# Patient Record
Sex: FEMALE | Race: BLACK OR AFRICAN AMERICAN | NOT HISPANIC OR LATINO | Employment: UNEMPLOYED | URBAN - METROPOLITAN AREA
[De-identification: names, ages, dates, MRNs, and addresses within clinical notes are randomized per-mention and may not be internally consistent; named-entity substitution may affect disease eponyms.]

---

## 2018-02-04 LAB — GROUP B STREP PCR: NEGATIVE

## 2018-08-29 LAB
HBV SURFACE AG SERPL QL IA: NEGATIVE
HIV 1+2 AB+HIV1 P24 AG SERPL QL IA: NEGATIVE

## 2018-12-03 LAB — GLU GEST SCREEN 1HR 50G: 99

## 2019-02-23 ENCOUNTER — HOSPITAL ENCOUNTER (INPATIENT)
Facility: CLINIC | Age: 29
LOS: 2 days | Discharge: HOME HEALTH | End: 2019-02-26
Attending: OBSTETRICS & GYNECOLOGY | Admitting: OBSTETRICS & GYNECOLOGY
Payer: COMMERCIAL

## 2019-02-23 PROBLEM — Z36.89 ENCOUNTER FOR TRIAGE IN PREGNANT PATIENT: Status: ACTIVE | Noted: 2019-02-23

## 2019-02-23 PROCEDURE — 40000268 ZZH STATISTIC NO CHARGES

## 2019-02-23 PROCEDURE — 84112 EVAL AMNIOTIC FLUID PROTEIN: CPT | Performed by: OBSTETRICS & GYNECOLOGY

## 2019-02-23 NOTE — LETTER
February 24, 2019      RE: Cici Bennett (patient)  [id number]  [facility] [id number]       To whom it may concern:    Cici Bennett was in the hospital to deliver her baby on 2/24/2019. Please excuse her  Zaid Falk from work to help care for her and their new baby Rex Mckeon.    Sincerely,           Betty Zapata MD

## 2019-02-24 LAB
ABO + RH BLD: NORMAL
ABO + RH BLD: NORMAL
AMPHETAMINES UR QL SCN: NEGATIVE
BASOPHILS # BLD AUTO: 0 10E9/L (ref 0–0.2)
BASOPHILS NFR BLD AUTO: 0.2 %
BLD GP AB SCN SERPL QL: NORMAL
BLOOD BANK CMNT PATIENT-IMP: NORMAL
CANNABINOIDS UR QL: NEGATIVE
COCAINE UR QL: NEGATIVE
DIFFERENTIAL METHOD BLD: ABNORMAL
EOSINOPHIL # BLD AUTO: 0.1 10E9/L (ref 0–0.7)
EOSINOPHIL NFR BLD AUTO: 0.9 %
ERYTHROCYTE [DISTWIDTH] IN BLOOD BY AUTOMATED COUNT: 12.7 % (ref 10–15)
GLUCOSE BLDC GLUCOMTR-MCNC: 101 MG/DL (ref 70–99)
GLUCOSE BLDC GLUCOMTR-MCNC: 64 MG/DL (ref 70–99)
GLUCOSE BLDC GLUCOMTR-MCNC: 68 MG/DL (ref 70–99)
HCT VFR BLD AUTO: 36.3 % (ref 35–47)
HGB BLD-MCNC: 12.4 G/DL (ref 11.7–15.7)
IMM GRANULOCYTES # BLD: 0.1 10E9/L (ref 0–0.4)
IMM GRANULOCYTES NFR BLD: 0.8 %
LYMPHOCYTES # BLD AUTO: 2 10E9/L (ref 0.8–5.3)
LYMPHOCYTES NFR BLD AUTO: 19.5 %
MCH RBC QN AUTO: 31.4 PG (ref 26.5–33)
MCHC RBC AUTO-ENTMCNC: 34.2 G/DL (ref 31.5–36.5)
MCV RBC AUTO: 92 FL (ref 78–100)
MONOCYTES # BLD AUTO: 0.6 10E9/L (ref 0–1.3)
MONOCYTES NFR BLD AUTO: 6.4 %
NEUTROPHILS # BLD AUTO: 7.3 10E9/L (ref 1.6–8.3)
NEUTROPHILS NFR BLD AUTO: 72.2 %
NRBC # BLD AUTO: 0 10*3/UL
NRBC BLD AUTO-RTO: 0 /100
OPIATES UR QL SCN: NEGATIVE
PCP UR QL SCN: NEGATIVE
PLATELET # BLD AUTO: 85 10E9/L (ref 150–450)
RBC # BLD AUTO: 3.95 10E12/L (ref 3.8–5.2)
RUPTURE OF FETAL MEMBRANES BY ROM PLUS: NEGATIVE
SPECIMEN EXP DATE BLD: NORMAL
T PALLIDUM AB SER QL: NONREACTIVE
WBC # BLD AUTO: 10.1 10E9/L (ref 4–11)

## 2019-02-24 PROCEDURE — 25800030 ZZH RX IP 258 OP 636: Performed by: INTERNAL MEDICINE

## 2019-02-24 PROCEDURE — 00000146 ZZHCL STATISTIC GLUCOSE BY METER IP

## 2019-02-24 PROCEDURE — 80307 DRUG TEST PRSMV CHEM ANLYZR: CPT | Performed by: STUDENT IN AN ORGANIZED HEALTH CARE EDUCATION/TRAINING PROGRAM

## 2019-02-24 PROCEDURE — 0KQM0ZZ REPAIR PERINEUM MUSCLE, OPEN APPROACH: ICD-10-PCS | Performed by: OBSTETRICS & GYNECOLOGY

## 2019-02-24 PROCEDURE — 25000132 ZZH RX MED GY IP 250 OP 250 PS 637

## 2019-02-24 PROCEDURE — 86900 BLOOD TYPING SEROLOGIC ABO: CPT | Performed by: STUDENT IN AN ORGANIZED HEALTH CARE EDUCATION/TRAINING PROGRAM

## 2019-02-24 PROCEDURE — 25000125 ZZHC RX 250

## 2019-02-24 PROCEDURE — 85025 COMPLETE CBC W/AUTO DIFF WBC: CPT | Performed by: STUDENT IN AN ORGANIZED HEALTH CARE EDUCATION/TRAINING PROGRAM

## 2019-02-24 PROCEDURE — 12000001 ZZH R&B MED SURG/OB UMMC

## 2019-02-24 PROCEDURE — 25000132 ZZH RX MED GY IP 250 OP 250 PS 637: Performed by: STUDENT IN AN ORGANIZED HEALTH CARE EDUCATION/TRAINING PROGRAM

## 2019-02-24 PROCEDURE — G0463 HOSPITAL OUTPT CLINIC VISIT: HCPCS

## 2019-02-24 PROCEDURE — 86850 RBC ANTIBODY SCREEN: CPT | Performed by: STUDENT IN AN ORGANIZED HEALTH CARE EDUCATION/TRAINING PROGRAM

## 2019-02-24 PROCEDURE — 25000125 ZZHC RX 250: Performed by: STUDENT IN AN ORGANIZED HEALTH CARE EDUCATION/TRAINING PROGRAM

## 2019-02-24 PROCEDURE — 87517 HEPATITIS B DNA QUANT: CPT | Performed by: STUDENT IN AN ORGANIZED HEALTH CARE EDUCATION/TRAINING PROGRAM

## 2019-02-24 PROCEDURE — 72200001 ZZH LABOR CARE VAGINAL DELIVERY SINGLE

## 2019-02-24 PROCEDURE — 25000128 H RX IP 250 OP 636: Performed by: STUDENT IN AN ORGANIZED HEALTH CARE EDUCATION/TRAINING PROGRAM

## 2019-02-24 PROCEDURE — 25000125 ZZHC RX 250: Performed by: INTERNAL MEDICINE

## 2019-02-24 PROCEDURE — 86901 BLOOD TYPING SEROLOGIC RH(D): CPT | Performed by: STUDENT IN AN ORGANIZED HEALTH CARE EDUCATION/TRAINING PROGRAM

## 2019-02-24 PROCEDURE — 0064U ANTB TP TOTAL&RPR IA QUAL: CPT | Performed by: STUDENT IN AN ORGANIZED HEALTH CARE EDUCATION/TRAINING PROGRAM

## 2019-02-24 RX ORDER — TERBUTALINE SULFATE 1 MG/ML
0.25 INJECTION, SOLUTION SUBCUTANEOUS
Status: DISCONTINUED | OUTPATIENT
Start: 2019-02-24 | End: 2019-02-24

## 2019-02-24 RX ORDER — OXYTOCIN/0.9 % SODIUM CHLORIDE 30/500 ML
100-340 PLASTIC BAG, INJECTION (ML) INTRAVENOUS CONTINUOUS PRN
Status: COMPLETED | OUTPATIENT
Start: 2019-02-24 | End: 2019-02-24

## 2019-02-24 RX ORDER — MISOPROSTOL 200 UG/1
TABLET ORAL
Status: COMPLETED
Start: 2019-02-24 | End: 2019-02-24

## 2019-02-24 RX ORDER — AMOXICILLIN 250 MG
1 CAPSULE ORAL 2 TIMES DAILY
Status: DISCONTINUED | OUTPATIENT
Start: 2019-02-24 | End: 2019-02-26 | Stop reason: HOSPADM

## 2019-02-24 RX ORDER — MISOPROSTOL 200 UG/1
800 TABLET ORAL
Status: DISCONTINUED | OUTPATIENT
Start: 2019-02-24 | End: 2019-02-26 | Stop reason: HOSPADM

## 2019-02-24 RX ORDER — BISACODYL 10 MG
10 SUPPOSITORY, RECTAL RECTAL DAILY PRN
Status: DISCONTINUED | OUTPATIENT
Start: 2019-02-26 | End: 2019-02-26 | Stop reason: HOSPADM

## 2019-02-24 RX ORDER — LIDOCAINE 40 MG/G
CREAM TOPICAL
Status: DISCONTINUED | OUTPATIENT
Start: 2019-02-24 | End: 2019-02-24

## 2019-02-24 RX ORDER — CARBOPROST TROMETHAMINE 250 UG/ML
250 INJECTION, SOLUTION INTRAMUSCULAR
Status: DISCONTINUED | OUTPATIENT
Start: 2019-02-24 | End: 2019-02-26 | Stop reason: HOSPADM

## 2019-02-24 RX ORDER — IBUPROFEN 600 MG/1
600 TABLET, FILM COATED ORAL EVERY 6 HOURS PRN
Status: DISCONTINUED | OUTPATIENT
Start: 2019-02-24 | End: 2019-02-26 | Stop reason: HOSPADM

## 2019-02-24 RX ORDER — NALOXONE HYDROCHLORIDE 0.4 MG/ML
.1-.4 INJECTION, SOLUTION INTRAMUSCULAR; INTRAVENOUS; SUBCUTANEOUS
Status: DISCONTINUED | OUTPATIENT
Start: 2019-02-24 | End: 2019-02-24

## 2019-02-24 RX ORDER — HYDROCORTISONE 2.5 %
CREAM (GRAM) TOPICAL 3 TIMES DAILY PRN
Status: DISCONTINUED | OUTPATIENT
Start: 2019-02-24 | End: 2019-02-26 | Stop reason: HOSPADM

## 2019-02-24 RX ORDER — LIDOCAINE HYDROCHLORIDE 10 MG/ML
INJECTION, SOLUTION EPIDURAL; INFILTRATION; INTRACAUDAL; PERINEURAL
Status: COMPLETED
Start: 2019-02-24 | End: 2019-02-24

## 2019-02-24 RX ORDER — CARBOPROST TROMETHAMINE 250 UG/ML
250 INJECTION, SOLUTION INTRAMUSCULAR
Status: DISCONTINUED | OUTPATIENT
Start: 2019-02-24 | End: 2019-02-24

## 2019-02-24 RX ORDER — OXYTOCIN 10 [USP'U]/ML
INJECTION, SOLUTION INTRAMUSCULAR; INTRAVENOUS
Status: DISCONTINUED
Start: 2019-02-24 | End: 2019-02-24 | Stop reason: WASHOUT

## 2019-02-24 RX ORDER — OXYTOCIN/0.9 % SODIUM CHLORIDE 30/500 ML
100 PLASTIC BAG, INJECTION (ML) INTRAVENOUS CONTINUOUS
Status: DISCONTINUED | OUTPATIENT
Start: 2019-02-24 | End: 2019-02-26 | Stop reason: HOSPADM

## 2019-02-24 RX ORDER — SODIUM CHLORIDE, SODIUM LACTATE, POTASSIUM CHLORIDE, CALCIUM CHLORIDE 600; 310; 30; 20 MG/100ML; MG/100ML; MG/100ML; MG/100ML
INJECTION, SOLUTION INTRAVENOUS CONTINUOUS
Status: DISCONTINUED | OUTPATIENT
Start: 2019-02-24 | End: 2019-02-24

## 2019-02-24 RX ORDER — OXYCODONE AND ACETAMINOPHEN 5; 325 MG/1; MG/1
1 TABLET ORAL
Status: DISCONTINUED | OUTPATIENT
Start: 2019-02-24 | End: 2019-02-24

## 2019-02-24 RX ORDER — OXYTOCIN/0.9 % SODIUM CHLORIDE 30/500 ML
1-24 PLASTIC BAG, INJECTION (ML) INTRAVENOUS CONTINUOUS
Status: DISCONTINUED | OUTPATIENT
Start: 2019-02-24 | End: 2019-02-24

## 2019-02-24 RX ORDER — NALOXONE HYDROCHLORIDE 0.4 MG/ML
.1-.4 INJECTION, SOLUTION INTRAMUSCULAR; INTRAVENOUS; SUBCUTANEOUS
Status: DISCONTINUED | OUTPATIENT
Start: 2019-02-24 | End: 2019-02-26 | Stop reason: HOSPADM

## 2019-02-24 RX ORDER — OXYTOCIN/0.9 % SODIUM CHLORIDE 30/500 ML
PLASTIC BAG, INJECTION (ML) INTRAVENOUS
Status: DISCONTINUED
Start: 2019-02-24 | End: 2019-02-24 | Stop reason: WASHOUT

## 2019-02-24 RX ORDER — FENTANYL CITRATE 50 UG/ML
50-100 INJECTION, SOLUTION INTRAMUSCULAR; INTRAVENOUS
Status: DISCONTINUED | OUTPATIENT
Start: 2019-02-24 | End: 2019-02-24

## 2019-02-24 RX ORDER — METHYLERGONOVINE MALEATE 0.2 MG/ML
200 INJECTION INTRAVENOUS
Status: DISCONTINUED | OUTPATIENT
Start: 2019-02-24 | End: 2019-02-26 | Stop reason: HOSPADM

## 2019-02-24 RX ORDER — METHYLERGONOVINE MALEATE 0.2 MG/ML
200 INJECTION INTRAVENOUS
Status: DISCONTINUED | OUTPATIENT
Start: 2019-02-24 | End: 2019-02-24

## 2019-02-24 RX ORDER — AMOXICILLIN 250 MG
2 CAPSULE ORAL 2 TIMES DAILY
Status: DISCONTINUED | OUTPATIENT
Start: 2019-02-24 | End: 2019-02-26 | Stop reason: HOSPADM

## 2019-02-24 RX ORDER — OXYTOCIN/0.9 % SODIUM CHLORIDE 30/500 ML
340 PLASTIC BAG, INJECTION (ML) INTRAVENOUS CONTINUOUS PRN
Status: DISCONTINUED | OUTPATIENT
Start: 2019-02-24 | End: 2019-02-26 | Stop reason: HOSPADM

## 2019-02-24 RX ORDER — LANOLIN 100 %
OINTMENT (GRAM) TOPICAL
Status: DISCONTINUED | OUTPATIENT
Start: 2019-02-24 | End: 2019-02-26 | Stop reason: HOSPADM

## 2019-02-24 RX ORDER — ACETAMINOPHEN 325 MG/1
650 TABLET ORAL EVERY 4 HOURS PRN
Status: DISCONTINUED | OUTPATIENT
Start: 2019-02-24 | End: 2019-02-26 | Stop reason: HOSPADM

## 2019-02-24 RX ORDER — IBUPROFEN 800 MG/1
800 TABLET, FILM COATED ORAL
Status: DISCONTINUED | OUTPATIENT
Start: 2019-02-24 | End: 2019-02-24

## 2019-02-24 RX ORDER — ACETAMINOPHEN 325 MG/1
650 TABLET ORAL EVERY 4 HOURS PRN
Status: DISCONTINUED | OUTPATIENT
Start: 2019-02-24 | End: 2019-02-24

## 2019-02-24 RX ORDER — DEXTROSE, SODIUM CHLORIDE, SODIUM LACTATE, POTASSIUM CHLORIDE, AND CALCIUM CHLORIDE 5; .6; .31; .03; .02 G/100ML; G/100ML; G/100ML; G/100ML; G/100ML
500 INJECTION, SOLUTION INTRAVENOUS ONCE
Status: DISCONTINUED | OUTPATIENT
Start: 2019-02-24 | End: 2019-02-24

## 2019-02-24 RX ORDER — ONDANSETRON 2 MG/ML
4 INJECTION INTRAMUSCULAR; INTRAVENOUS EVERY 6 HOURS PRN
Status: DISCONTINUED | OUTPATIENT
Start: 2019-02-24 | End: 2019-02-24

## 2019-02-24 RX ORDER — OXYTOCIN 10 [USP'U]/ML
10 INJECTION, SOLUTION INTRAMUSCULAR; INTRAVENOUS
Status: DISCONTINUED | OUTPATIENT
Start: 2019-02-24 | End: 2019-02-26 | Stop reason: HOSPADM

## 2019-02-24 RX ORDER — OXYTOCIN 10 [USP'U]/ML
10 INJECTION, SOLUTION INTRAMUSCULAR; INTRAVENOUS
Status: DISCONTINUED | OUTPATIENT
Start: 2019-02-24 | End: 2019-02-24

## 2019-02-24 RX ADMIN — SODIUM CHLORIDE, POTASSIUM CHLORIDE, SODIUM LACTATE AND CALCIUM CHLORIDE 1000 ML: 600; 310; 30; 20 INJECTION, SOLUTION INTRAVENOUS at 10:09

## 2019-02-24 RX ADMIN — OXYTOCIN-SODIUM CHLORIDE 0.9% IV SOLN 30 UNIT/500ML 2 MILLI-UNITS/MIN: 30-0.9/5 SOLUTION at 10:10

## 2019-02-24 RX ADMIN — MISOPROSTOL 400 MCG: 200 TABLET ORAL at 20:31

## 2019-02-24 RX ADMIN — OXYTOCIN-SODIUM CHLORIDE 0.9% IV SOLN 30 UNIT/500ML 340 ML/HR: 30-0.9/5 SOLUTION at 18:56

## 2019-02-24 RX ADMIN — LIDOCAINE HYDROCHLORIDE 10 ML: 10 INJECTION, SOLUTION EPIDURAL; INFILTRATION; INTRACAUDAL; PERINEURAL at 19:04

## 2019-02-24 RX ADMIN — ACETAMINOPHEN 650 MG: 325 TABLET, FILM COATED ORAL at 22:57

## 2019-02-24 RX ADMIN — FENTANYL CITRATE 100 MCG: 50 INJECTION, SOLUTION INTRAMUSCULAR; INTRAVENOUS at 18:16

## 2019-02-24 RX ADMIN — FENTANYL CITRATE 50 MCG: 50 INJECTION, SOLUTION INTRAMUSCULAR; INTRAVENOUS at 17:17

## 2019-02-24 SDOH — HEALTH STABILITY: MENTAL HEALTH: HOW OFTEN DO YOU HAVE A DRINK CONTAINING ALCOHOL?: NEVER

## 2019-02-24 NOTE — PLAN OF CARE
Patient fasting blood sugar this am 68. Laying on left side and drinking apple juice. Nurse with patient. Will recheck blood sugar and continue to monitor.

## 2019-02-24 NOTE — PROGRESS NOTES
[facility]  Labor Progress Note    Subjective:  Contraction intensity increasing but still only 4/10.     Objective:   Patient Vitals for the past 4 hrs:   BP Temp Temp src   19 1531 134/63 -- --   19 1415 -- 98.8  F (37.1  C) Oral   19 1315 -- 97.9  F (36.6  C) Oral     SVE: 2/60/-3     FHT: Baseline 125, moderate variability, + accelerations, deceleration with dedra in 90's, return to baseline with positional change  [place]: 3-4 contractions in 10 minutes    Assessment/Plan:  Ms. Cici Bennett is a 28 year old  at 38w6d by 13w2d US, admitted with PROM.    PROM:  - Has been on pitocin since 1010, small amount cervical progress, contractions increasing in intensity and regularity, plan to continue pit.   - she is s/p CS consent with aid of  this AM, to avoid possibility of emergent CS without ability to consent with .   - anticipate     PNC:  - dates established by early US at 13w2d  - Rh positive, antibody negative, Rubella immune, HIV negative, HepC negative, GCT 99, RPR negative,   - HepB core Ab positive, chronic or previous infection. PCR viral level pending at this time.   - HepA IgG positive  - GBS negative  - s/p Tdap 19, Flu 19  - anterior placenta     Thrombocytopenia, likely gestational:  - admit plt 85  - will continue to monitor  - Per care everywhere has had decreasing plt levels     FWB:      - Category 2 FHT as above.  Continue EFM and toco.     Farida Albert MD  OB/GYN PGY-1  19 3:53 PM

## 2019-02-24 NOTE — PROVIDER NOTIFICATION
02/24/19 0624   Provider Notification   Provider Name/Title Dr. Berger   Method of Notification Phone   Request Evaluate - Remote   Notification Reason Lab/Diagnostic Study   Patient drank 240 ml of apple juice due to fasting blood sugar 68.. Rechecked patient blood glucose approx 15-20 minutes later and 64. 240 ml of apple juice given to patient and sips taken. Dr. Berger notified and will place new orders. Patient denies symptoms of hypoglycemia such as shakiness, dizziness, pallor etc. Will continue to monitor closely.

## 2019-02-24 NOTE — PROGRESS NOTES
Introduced self to patient with Oromo .  Patient still not feeling contractions  No other c/o.     BP (!) 87/51   Temp 98  F (36.7  C) (Oral)   Resp 20   Breastfeeding? No   Gen: A&O, pleasant, NAD  Abdomen soft, nontender, gravid    FHTs 125, moderate variability +accels, occasional variables with quick return to baseline, CAT2  [place]: q3-6 min, irregular    -plan to continue to titrate pitocin to adequate contractions  -monitor FHTs, overall reassuring  -anticipate     Maggie Thompson MD  2019  12:06 PM

## 2019-02-24 NOTE — H&P
[facility]  OB History and Physical      Cici Bennett MRN# [id number]   Age: 28 year old YOB: 1990     CC:  contractions    HPI:  Ms. Cici Bennett is a 28 year old  at 38w6d by patient stated ARMANDO of 3/4/19. She presents today with contractions that started around 9PM. She is accompanied by her , sister in law and mother in law. An in person  was not available and the ipad  did not work. The  and sister in law helped with the history and , however the conversation was difficult and limited due to limited English and medical knowledge. Reports leaking fluid and feeling a gush while in triage. No vaginal bleeding.  + normal fetal movement. She reports PNC with Dr. Perry at [facility], however no records were available in care everywhere.     Pregnancy Complications:  - None    Prenatal Labs:   No results found for: ABO, RH, AS, HEPBANG, CHPCRT, GCPCRT, TREPAB, RPR, RUBELLAABIGG, HGB, HIV    GBS Status:   No results found for: GBS      OB History     x1 in [place], no problems reported.    PMHx: denies  PSHx: denies  Meds: denies  Allergies:  No Known Allergies   FmHx: No family history on file.    ROS:   A 14 point review of systems was completed and was negative except for points mentioned in the HPI.     PE:  Vit: No data found.   Gen: Well-appearing, NAD, comfortabl  CV: Well perfused  Pulm:  No increased work of breathing  Abd: Soft, gravid, non-tender  Ext: trace LE edema b/l  Cx: /long/-3, pool of fluid on the bed and floor that is yellow-green in color, consistent with meconium   BSUS: cephalic presentation, fundal placenta.   EFW:  7.5# by leopolds  Memb: SROM with meconium in triage              FHT: Baseline 120, moderate variability, + accelerations, no decelerations   [place]: Uterine irritability present, 0-2 contractions in 10 minutes      Labs:  ROM+ was negative, however this was collected prior to seeing pool of  fluid on the triage bed.     Assessment  Ms. Cici Bennett is a 28 year old  at 38w6d by patient stated ARMANDO of 3/4/19 admitted with suspected SROM with meconium.     Plan  PROM:  - Strong suspicion for SROM with meconium based on physical exam  - expectant management  - When an in person [place]romo  is available will conduct a more through HPI  - admit labs: CBC, type and screen, RPR  - GBS unknown, no PCN since she is term    FWB:   Category 1 FHT.  Continue EFM and toco    PNC:  - When an in person  is present will attempt to find prenatal records in care everywhere again    The patient was discussed with Dr. Loyd who is in agreement with the treatment plan.    Davina Berger MD PhD  Ob/Gyn, PGY-2  2019, 12:54 AM    I have reviewed course, findings and history. I agree with the excellent note.   Pam Loyd

## 2019-02-24 NOTE — PROGRESS NOTES
[facility]  Labor Progress Note    Subjective:  No in person interpret available and the ipad  is not working. Sister in law helped interpret. Patient feeling ok. Agreeable to a cervical exam.    Objective:   Patient Vitals for the past 4 hrs:   BP Temp Temp src Resp   19 0540 95/50 98.2  F (36.8  C) Oral 16   19 0430 -- 97.8  F (36.6  C) Oral 16   19 0346 -- 97.9  F (36.6  C) Oral 16     SVE: 150/-2, soft, anterior    FHT: Baseline 125, moderate variability, + accelerations, no decelerations  [place]: 1-2 contractions in 10 minutes    Assessment/Plan:  Ms. Cici Bennett is a 28 year old  at 38w6d by 13w2d US, admitted with PROM.    PROM:  - some cervical change noted  - Plan to start pitocin, but want to wait for an in person  to discuss  - anticipate     Care everywhere records now available:  PNC:  - dates established by early US at 13w2d  - Rh positive, antibody negative, Rubella immune, HIV negative, HepC negative, GCT 99, RPR negative,   - HepB core Ab positive, chronic or previous infection  - HepA IgG positive  - GBS negative  - s/p Tdap 19, Flu 19  - anterior placenta     Thrombocytopenia, likely gestational:  - admit plt 85  - will continue to monitor  - Per care everywhere has had decreasing plt levels     FWB:      - Category 1 FHT.  Continue EFM and toco    Davina Berger MD PhD  OB/GYN Resident, PGY-3  2019 7:25 AM

## 2019-02-24 NOTE — PROVIDER NOTIFICATION
02/24/19 0350   Provider Notification   Provider Name/Title Dr. Berger   Method of Notification Phone   Request Evaluate in Person   Notification Reason Lab/Diagnostic Study   Patient platelets 85 and notified Dr. Berger  No new orders at this time.

## 2019-02-24 NOTE — PROGRESS NOTES
[facility]  Labor Progress Note    Subjective:  Patient is resting in bed, feeling ok but tired.     Objective:   Patient Vitals for the past 4 hrs:   BP Temp Temp src Resp   19 0832 (!) 87/51 98.3  F (36.8  C) Oral 20   19 0720 -- 98.5  F (36.9  C) Oral 16     SVE: /-2 at 0730 by Dr. Berger     FHT: Baseline 125, moderate variability, + accelerations, no decelerations  [place]: 1-2 contractions in 10 minutes    Assessment/Plan:  Ms. Cici Bennett is a 28 year old  at 38w6d by 13w2d US, admitted with PROM.    PROM:  - some cervical change noted on this morning's exam  - With aid of , discussed initiation of pitocin, which patient agrees to.   - Planning on IV Fentanyl for pain when she becomes more uncomfortable, understands this will not be an option closer to delivery.   - Since in-person person is available at this time and phone interpreters have been difficult to contact, took opportunity to discuss  section consent with patient. She understands that there is no plan for  at this time, but consents in case of maternal or fetal indications to CS and/or blood transfusion. Informed consent obtained with aid of .   - anticipate     Care everywhere records now available:  PNC:  - dates established by early US at 13w2d  - Rh positive, antibody negative, Rubella immune, HIV negative, HepC negative, GCT 99, RPR negative,   - HepB core Ab positive, chronic or previous infection. PCR viral level pending at this time.   - HepA IgG positive  - GBS negative  - s/p Tdap 19, Flu 19  - anterior placenta     Thrombocytopenia, likely gestational:  - admit plt 85  - will continue to monitor  - Per care everywhere has had decreasing plt levels     FWB:      - Category 1 FHT.  Continue EFM and toco    Farida Albert MD  OB/GYN PGY-1  19 10:47 AM

## 2019-02-24 NOTE — PLAN OF CARE
Data: Patient presented to [facility] at 2300.   Reason for maternal/fetal assessment per patient is Decreased Fetal Movement and Rule out rupture of membranes  .  Patient is a . Prenatal record reviewed.      Obstetric History       T0      L0     SAB0   TAB0   Ectopic0   Multiple0   Live Births0       # Outcome Date GA Lbr Napoleon/2nd Weight Sex Delivery Anes PTL Lv   1 Current               . Medical history: No past medical history on file.. Gestational Age 38w6d. VSS. Fetal movement present. Patient denies UTI symptoms, GI problems, bloody show, vaginal bleeding, edema, headache, visual disturbances, epigastric or URQ pain. Support persons are present.  Action: Verbal consent for EFM. Triage assessment completed. EFM applied Presumed adequate fetal oxygenation documented (see flow record).   Response: Dr. Berger informed of arrival and ROM+ results. Plan per provider is admission. Patient verbalized agreement with plan. Patient transferred to room 477 ambulatory, oriented to room and call light. Report given to MARIA LUZ Rodas RN.

## 2019-02-24 NOTE — ED NOTES
Pt arrived to ED with complaint of back pain, broken water. Pt reports 38 weeks pregnant.   Pt is having contractions Yes.   Pt feels urge to push Yes, earlier today.   Pt reports water broke Yes.   Report was called and pt was transferred to L&D Yes.   Patient speaks Oromo.

## 2019-02-24 NOTE — PLAN OF CARE
Patient vital signs stable and afebrile. SVE at 0725 1/50/-3. Continues to leak meconium stained fluid on chux pads. MIL and AUDREY are in room. AUDREY interpreting for patient. Patient complaining of back and uterine pain. Using hot packs for pain management. Will continue to monitor and continue with plan of care.

## 2019-02-25 LAB
ERYTHROCYTE [DISTWIDTH] IN BLOOD BY AUTOMATED COUNT: 12.9 % (ref 10–15)
HCT VFR BLD AUTO: 34.7 % (ref 35–47)
HGB BLD-MCNC: 11.1 G/DL (ref 11.7–15.7)
MCH RBC QN AUTO: 30.5 PG (ref 26.5–33)
MCHC RBC AUTO-ENTMCNC: 32 G/DL (ref 31.5–36.5)
MCV RBC AUTO: 95 FL (ref 78–100)
PLATELET # BLD AUTO: 91 10E9/L (ref 150–450)
RBC # BLD AUTO: 3.64 10E12/L (ref 3.8–5.2)
WBC # BLD AUTO: 11.4 10E9/L (ref 4–11)

## 2019-02-25 PROCEDURE — 25000132 ZZH RX MED GY IP 250 OP 250 PS 637: Performed by: STUDENT IN AN ORGANIZED HEALTH CARE EDUCATION/TRAINING PROGRAM

## 2019-02-25 PROCEDURE — 12000001 ZZH R&B MED SURG/OB UMMC

## 2019-02-25 PROCEDURE — 36415 COLL VENOUS BLD VENIPUNCTURE: CPT | Performed by: STUDENT IN AN ORGANIZED HEALTH CARE EDUCATION/TRAINING PROGRAM

## 2019-02-25 PROCEDURE — 85027 COMPLETE CBC AUTOMATED: CPT | Performed by: STUDENT IN AN ORGANIZED HEALTH CARE EDUCATION/TRAINING PROGRAM

## 2019-02-25 RX ADMIN — IBUPROFEN 600 MG: 600 TABLET ORAL at 06:24

## 2019-02-25 RX ADMIN — IBUPROFEN 600 MG: 600 TABLET ORAL at 15:53

## 2019-02-25 RX ADMIN — IBUPROFEN 600 MG: 600 TABLET ORAL at 22:22

## 2019-02-25 RX ADMIN — SENNOSIDES AND DOCUSATE SODIUM 2 TABLET: 8.6; 5 TABLET ORAL at 19:30

## 2019-02-25 RX ADMIN — SENNOSIDES AND DOCUSATE SODIUM 2 TABLET: 8.6; 5 TABLET ORAL at 12:59

## 2019-02-25 RX ADMIN — ACETAMINOPHEN 650 MG: 325 TABLET, FILM COATED ORAL at 12:58

## 2019-02-25 NOTE — L&D DELIVERY NOTE
DELIVERY SUMMARY    Cici Bennett MRN# [id number]   Age: 28 year old YOB: 1990     Delivery Note:     Cici Bennett is a 28 year old  who presented at 38w6d for spontaneous rupture of membranes with meconium.  Pregnancy complications included gestational thrombocytopenia.  She made little spontaneous cervical change and was started on pitocin at 1010.  She was noted to be complete at 1850.  She pushed for 4 minutes and had a spontaneous vaginal delivery of a viable male infant in left occiput anterior position.  Apgars of 5, 7 and 8 with weight of 3742 grams.   Tight nuchal cord was noted and was not easily reducible. The cord was immediately clamped and cut.  A cord segment and cord blood were obtained.  The placenta was then delivered using gentle traction and suprapubic pressure.  The uterus was noted to be firm after IV pitocin and fundal massage.  The perineum was assessed for lacerations and a second degree laceration was noted.  The laceration was repaired in standard fashion using 3-0 Vicryl suture and 1% Lidocaine plain for anesthestic.  On final examination of the perineum, the repair was noted to be hemostatic.  Total QBL was 445 ml.  The placenta appeared intact with a 3V umbilical cord.  Dr. Beck was present for the entire procedure.     Betty Zapata MD   OB/GYN PGY-2    OB/GYN Staff -- Pt seen and examined by me.I was present for the delivery.  Agree with note as above.  MD Xochtil      [place] Assessment Tool Data    Gestational Age:  Gestational Age: 38w6d     Maternal temperature range:  Temp  Av.2  F (36.8  C)  Min: 97.8  F (36.6  C)  Max: 98.8  F (37.1  C)    Membranes ruptured for:   rupture date, rupture time, delivery date, or delivery time have not been documented     GBS status:  Lab Results   Component Value Date    GBS Negative 2018       Antibiotic Status:  Antibiotics         IV Antibiotic Given         Additional Management      Fetal Status  "Prior to  Delivery Category 1    Fetal Status Comments         Sepsis Prebirth Score:       Sepsis Postbirth Score:       Determination based on clinical exam after birth:       Disposition:           Delivery/Placenta Date and Time    Delivery Date:  19 Delivery Time:   6:54 PM      Apgars     1 Minute 5 Minute 10 Minute 15 Minute 20 Minute   Skin color: 0  0  0      Heart rate: 2  2  2      Reflex irritability: 1  2  2      Muscle tone: 1  1  2      Respiratory effort: 1  2  2      Total: 5  7  8      Apgars assigned by:  JANETTE LEMONS     Cord    Vessels:  3 Vessels       [place] Resuscitation    Methods:  NCPAP, Oximetry, Lee Puff   Care at Delivery:  NN Delivery Note    Asked by Dr. Beck to attend the delivery of this full term, male infant with a gestational age of 38 6/7 weeks secondary to meconium.      Infant delivered at 1854 hours on 2019. Infant presented with as non vigorous with low tone. He was placed on a warmer, dried, stimulated, and suctioned at birth. PPV was initiated at 1 minute of life with pressures of 20/5. He had good chest rise with a heart rate >100. He began crying after 20 seconds of PPV. He was transitioned to CPAP +5. Saturations remained in acceptable ranges. CPAP remained on for 3 minutes due to grunting and work of breathing. By 5 minutes he was transitioned to room air. Saturations were >94% despite overall pale color. Apgars were 5 at one minute and 7 at five minutes of age and 8 at 10 minutes. By 10 minutes he was active, alert, looking around, had good suck and grasp reflex with good tone. Gross PE is WNL except for pale color. CBC ordered to be drawn. Infant was shown to mother and will be transferred to the [facility] for further care.    JANETTE Lemons-BC 2019 7:39 PM      Measurements    Weight:  8 lb 4 oz Length:  1' 10\"   Head circumference:  34.9 cm       Labor Events and Shoulder Dystocia    Fetal Tracing Prior to Delivery: "  Category 1  Shoulder dystocia present?:  Neg     Delivery (Maternal) (Provider to Complete) (621793)    Perineal lacerations:  2nd Repaired?:  Yes   Vaginal laceration?:  No    Cervical laceration?:  No       Blood Loss  Mother: Cici Bennett #[id number]   Start of Mother's Information    IO Blood Loss  02/24/19 0654 - 02/24/19 1946    None           End of Mother's Information  Mother: Cici Bennett #[id number]         Delivery - Provider to Complete ([id number])    Delivering clinician:  Mary Beck MD  Attempted Delivery Types (Choose all that apply):  Spontaneous Vaginal Delivery  Delivery Type (Choose the 1 that will go to the Birth History):  Vaginal, Spontaneous   Other personnel:   Provider Role   Betty Zapata MD Resident   Crow Dejesus Medical Student         Presentation and Position    Presentation:  Vertex  Position:  Left Occiput Anterior           Betty Zapata MD

## 2019-02-25 NOTE — PROVIDER NOTIFICATION
02/24/19 2248   Provider Notification   Provider Name/Title Dr. Zapata G2   Method of Notification Electronic Page   Request Evaluate-Remote   Notification Reason Vital Signs Change  (temperature of 101.1 axillary)   Patient temperature is 101.1 axillary. blood pressure 120/80, pulse 80, RR 18. Would you like me to do anything more than give patient 650mg of Tylenol?

## 2019-02-25 NOTE — PROVIDER NOTIFICATION
"   02/25/19 0848   Provider Notification   Provider Name/Title Dr. Tam   Method of Notification Electronic Page   Request Evaluate in Person   Notification Reason Other  (intepreter)   \"[facility]  here for 0948. Thanks!\" Text page sent at 0848  "

## 2019-02-25 NOTE — PLAN OF CARE
Stable postpartum, denies pain. Breastfeeding  independently. Family here with pt. [place]  here. Pt resting.

## 2019-02-25 NOTE — PLAN OF CARE
Vaginal Delivery Note   of viable Male with Dr. Beck and Dr. Zapata in attendance.  NICU present.  Infant with spontaneous cry, to mother's abdomen, dried and stimulated.  APGAR at 1 minute:  5, APGAR at 5 minutes:  7 and APGAR at 10 minutes:  8 .  Placenta delivered with out complication, 30 mu/min.,  laceration, with repair, renita cares provided.  Mother and baby in stable condition.

## 2019-02-25 NOTE — PROGRESS NOTES
[facility]  Labor Progress Note    Subjective:  Very uncomfortable.     Objective:   Patient Vitals for the past 4 hrs:   BP Temp Temp src Resp   19 1730 -- 98.2  F (36.8  C) Oral --   19 1531 134/63 98.6  F (37  C) Oral 20     SVE: 2/60/-3, unchanged    FHT: Baseline 120, moderate variability, + accelerations, early decelerations  [place]: 3-4 contractions in 10 minutes    Assessment/Plan:  Ms. Cici Bennett is a 28 year old  at 38w6d by 13w2d US, admitted with PROM.    PROM:  - Has been on pitocin since 1010, cervix unchanged, contraction increasing in intensity, plan to continue pitocin.   - she is s/p CS consent with aid of  this AM, to avoid possibility of emergent CS without ability to consent with .   - anticipate     PNC:  - dates established by early US at 13w2d  - Rh positive, antibody negative, Rubella immune, HIV negative, HepC negative, GCT 99, RPR negative,   - HepB core Ab positive, chronic or previous infection. PCR viral level pending at this time.   - HepA IgG positive  - GBS negative  - s/p Tdap 19, Flu 19  - anterior placenta     Thrombocytopenia, likely gestational:  - admit plt 85  - will continue to monitor  - Per care everywhere has had decreasing plt levels     FWB:      - Category 1 FHT as above.  Continue EFM and toco.     Farida Albert MD  OB/GYN PGY-1  19 3:53 PM

## 2019-02-25 NOTE — PLAN OF CARE
Pt had a  at 1854, a viable baby boy. NICU present due to meconium and completed resuscitation measures (see delivery note). APGARS 5/7/8. Intact placenta delivered. 2nd degree laceration was repaired. Pitocin running via PIV. FF @ U/U, midline. Uterus needed extra massage at one point to firm up and there was extra gushes of rubra lochia. 400 mcg of miso administered buccally at . FF @ U/U, midline since then. Small amount of rubra lochia. Perineum is well-approximated, ice applied. Mother and  bonding through skin to skin and breastfeeding.  so far.    Anticipate transfer to [facility] on 7th floor around  post-void.

## 2019-02-25 NOTE — PROGRESS NOTES
Data: Cici Bennett transferred to 7142 via wheelchair at 2125. Baby transferred via parent's arms.  Action: Receiving unit notified of transfer: Yes. Patient and family notified of room change. Report given to NICOLE Weiner at 2140. Belongings sent to receiving unit. Accompanied by Registered Nurse. Oriented patient to surroundings. Call light within reach. ID bands double-checked with receiving RN.  Response: Patient tolerated transfer and is stable.

## 2019-02-25 NOTE — PROVIDER NOTIFICATION
02/24/19 1700   Provider Notification   Provider Name/Title Dr. Albert   Method of Notification At Bedside   Notification Reason SVE   D: Patient stating she is feeling the urge to push and wanting something for pain. Dr. Albert checked patient and she is unchanged. Will give Fentanyl as ordered for pain relief.P: Continue to monitor.

## 2019-02-25 NOTE — PROGRESS NOTES
Post Partum Progress Note  PPD# 1    Subjective:  She is resting comfortably in bed this morning. Pain is improving and well controlled on current medication regimen. She is tolerating PO intake. She is voiding without difficulty. She is ambulating without dizziness or difficulty.  She denies headache, changes in vision, nausea/vomiting, chest pain, shortness of breath, RUQ pain, or worsening edema.  Plans to breastfeed.    Objective:  Vitals:    19 2244 19 2345 19 0145 19 0600   BP:   96/64 102/70   Resp:   18 18   Temp: 101.1  F (38.4  C) 99.6  F (37.6  C) 98.7  F (37.1  C) 97.3  F (36.3  C)   TempSrc: Axillary Oral Oral Oral     General: NAD. A&Ox3.  CV: Regular rate, well perfused.   Pulm: Normal respiratory effort.  Abd: Soft, non-tender, non-distended. Fundus is firm and 3 cm below the umbilicus.    Ext: no lower extremity edema bilaterally. No calf tenderness.    Assessment/Plan:  Cici Bennett is a 28  female who is PPD#1 s/p spontaneous vaginal delivery. Pregnancy complications included gestational thrombocytopenia and Hep B positive.     - Encourage routine post-operative goals including ambulation and incentive spirometry  - PNC: Rh pos. Rubella immune. No intervention indicated.  - Pain: controlled on oral medications  - Heme: Hgb 12.4>>AM Hgb pending.  If <10 will discharge home with iron. VSS, asx for anemia.    GTCP: Plt 85>AM pending.   - GI: continue anti-emetics and stool softeners as needed.  - : Voiding spontaneously.  - ID: isolated fever Tmax/etvs362.1 (2254). Likely due to misoprostol. If febrile again, plan work-up and abx.    - Infant: Stable in pt's room  - Feeding: Plans on breastfeeding.  - BC: Pt is unsure of plan, would like to discuss her options later today    Discharge to home likely on PPD#2    Crow Dejesus, MS3    I was present with the medical student who participated in the service and in the documentation of this note.  I have verified the  history and personally performed the physical exam and medical decision making, and have verified the content of the note, which accurately reflect my assessment of the patient and the plan of care. Cici is a 28  PPD#1 from [facility]. Issues include gTCP. Platelets 85K on admission, repeat pending this morning. Isolated temp of 101.1 last night, likely due to misoprostol. No work-up or treatment indicated; consider if febrile again.Hep B core antibody positive 2018, neg serum antigen. Viral load pending. Hep C cruz neg .  Pain well-controlled, lochia appropriate. No dizziness, hgb pending. Breastfeeding. Plans TBD for birth control.    Lauren Carr MD  Ob/Gyn, PGY3  Pager [phone]    [facility] staff:  Appreciate note by Dr. Carr.  I have seen and examined the patient without the resident. I have reviewed, edited, and agree with the note.    My findings are:  [place]  already gone when I was able to round on patient. Ipad not able to connect. Used family member to interpret with patient's permission. No complaints, fundus firm. Doing well and without questions or concerns.   Maggie Thompson MD  2019  1:44 PM

## 2019-02-25 NOTE — PLAN OF CARE
Patients vitals have been stable. Temperature on admission to [facility] was elevated at 101.1 axillary and patient received 650 mg of tylenol. Temperature has been WDL since the tylenol. Voiding without difficulties. Fundus is firm with light amount of bleeding. Declines headache, dizziness, and blurred vision. Is taking ibuprofen for pain. Will continue to monitor for adequate pain control.

## 2019-02-25 NOTE — DISCHARGE SUMMARY
VAGINAL DELIVERY DISCHARGE SUMMARY    Admit date: 2019  Admit Attending: Pam Loyd MD  Discharge date: 19  Discharge Attending: Mary Carvajal MD    Admit Dx:   -  at 38w6d   - Spontaneous rupture of membranes  - Gestational thrombocytopenia vs ITP  - History of hep B and hep A    Discharge Dx:  - , s/p   - Gestational thrombocytopenia vs ITP  - History of hep B and hep A    Procedures:  - Spontaneous vaginal delivery    Admit HPI:  Cici Bennett is a 28 year old  who presented at 38w6d for spontaneous rupture of membranes with meconium on 2019. Please see her admit H&P for full details of her PMH, PSH, Meds, Allergies and exam on admit.    Hospital course:  She made little spontaneous cervical change and was started on pitocin at 1010.  She was noted to be complete at 1850.  She pushed for 4 minutes and had a spontaneous vaginal delivery of a viable male infant in left occiput anterior position.  Apgars of 5, 7 and 8 with weight of 3742 grams.   Tight nuchal cord was noted and was not easily reducible. The cord was immediately clamped and cut.  A cord segment and cord blood were obtained.  The placenta was then delivered using gentle traction and suprapubic pressure.  The uterus was noted to be firm after IV pitocin and fundal massage.  The perineum was assessed for lacerations and a second degree laceration was noted.  The laceration was repaired in standard fashion using 3-0 Vicryl suture and 1% Lidocaine plain for anesthestic.  On final examination of the perineum, the repair was noted to be hemostatic.  Total QBL was 445 ml.  The placenta appeared intact with a 3V umbilical cord. Please see her Delivery Summary for full details regarding her delivery.    Her postpartum course was uncomplicated. On PPD#2, she was meeting all of her postpartum goals and deemed stable for discharge. She was voiding without difficulty, tolerating a regular diet without nausea and  vomiting, her pain was well controlled on oral pain medicines and her lochia was appropriate. Her hemoglobin prior to delivery was 12.4 and after delivery was 11.1. Her platelets were stable at 91 at the time of discharge. Her Rh status was positive, and Rhogam was not indicated.     Discharge Medications:     Review of your medicines      START taking      Dose / Directions   acetaminophen 325 MG tablet  Commonly known as:  TYLENOL      Dose:  650 mg  Take 2 tablets (650 mg) by mouth every 6 hours as needed for mild pain Start after Delivery.  Quantity:  100 tablet  Refills:  0     ibuprofen 600 MG tablet  Commonly known as:  ADVIL/MOTRIN      Dose:  600 mg  Take 1 tablet (600 mg) by mouth every 6 hours as needed for moderate pain Start after delivery  Quantity:  60 tablet  Refills:  0     senna-docusate 8.6-50 MG tablet  Commonly known as:  SENOKOT-S/PERICOLACE      Dose:  1 tablet  Take 1 tablet by mouth daily Start after delivery.  Quantity:  30 tablet  Refills:  0           Where to get your medicines      These medications were sent to [facility] [address]  [address] [address]    Phone:  [phone]     acetaminophen 325 MG tablet    ibuprofen 600 MG tablet    senna-docusate 8.6-50 MG tablet       Discharge/Disposition:  Cici Bennett was discharged to home in stable condition with the following instructions/medications:  1) Call for temperature > 100.4, bright red vaginal bleeding >1 pad an hour x 2 hours, foul smelling vaginal discharge, pain not controlled by usual oral pain meds, persistent nausea and vomiting not controlled on medications  2) She was undecided about contraception.  3) For feeding she decided to breastfeed.  4) She was instructed to follow-up with her primary OB in 6 weeks for a routine postpartum visit.     Lauren Carr MD  Ob/Gyn, PGY3  Pager [phone]    Appreciate Dr. Carr's summary above, patient also seen  and examined by me on the day of discharge. I agree with the summary above.   Mary Carvajal MD

## 2019-02-25 NOTE — PLAN OF CARE
Patient arrived to [facility] unit via wheelchair at 2130 ,with belongings, accompanied by spouse/ significant other and family, with infant in arms. Received report from NICOLE Tate  and checked bands. Unit and room orientation started. Call light given and within arms reach; patients axillary temperature 101.1, provider notified and 650 mg of tylenol given. Recheck of temperature 35-40 minutes later was 99.6 orally. Other vitals WDL, see flowsheets. Continue with plan of care.

## 2019-02-26 VITALS
HEART RATE: 75 BPM | RESPIRATION RATE: 16 BRPM | SYSTOLIC BLOOD PRESSURE: 107 MMHG | TEMPERATURE: 97.7 F | DIASTOLIC BLOOD PRESSURE: 63 MMHG

## 2019-02-26 LAB
HBV DNA SERPL NAA+PROBE-ACNC: NORMAL [IU]/ML
HBV DNA SERPL NAA+PROBE-LOG IU: NORMAL {LOG_IU}/ML

## 2019-02-26 PROCEDURE — 25000132 ZZH RX MED GY IP 250 OP 250 PS 637: Performed by: STUDENT IN AN ORGANIZED HEALTH CARE EDUCATION/TRAINING PROGRAM

## 2019-02-26 RX ORDER — IBUPROFEN 600 MG/1
600 TABLET, FILM COATED ORAL EVERY 6 HOURS PRN
Qty: 60 TABLET | Refills: 0 | Status: SHIPPED | OUTPATIENT
Start: 2019-02-26 | End: 2019-03-28

## 2019-02-26 RX ORDER — AMOXICILLIN 250 MG
1 CAPSULE ORAL DAILY
Qty: 30 TABLET | Refills: 0 | Status: SHIPPED | OUTPATIENT
Start: 2019-02-26 | End: 2019-03-28

## 2019-02-26 RX ORDER — ACETAMINOPHEN 325 MG/1
650 TABLET ORAL EVERY 6 HOURS PRN
Qty: 100 TABLET | Refills: 0 | Status: SHIPPED | OUTPATIENT
Start: 2019-02-26 | End: 2019-03-28

## 2019-02-26 RX ADMIN — IBUPROFEN 600 MG: 600 TABLET ORAL at 04:31

## 2019-02-26 NOTE — PLAN OF CARE
Referral made to [facility] for early discharge & feeding issues

## 2019-02-26 NOTE — PLAN OF CARE
Pt taking ibuprofen for cramping. VSS and pt ambulating independently.  Pt has bilateral cracked nipples and was given hydrogels and instructed on their use.  She was encouraged to call for assist with latch to help her obtain a deeper latch.  Infant cluster feeding overnight.

## 2019-02-26 NOTE — PLAN OF CARE
Data: Vital signs within normal limits. Postpartum checks within normal limits - see flow record. Patient eating and drinking normally. Patient able to empty bladder independently and is up ambulating. No apparent signs of infection. Perineum  healing well. Patient performing self cares and is able to care for infant.  Action: Patient medicated during the shift for cramping. See MAR. Patient reassessed within 1 hour after each medication and pain was improved - patient stated she was comfortable. Patient education done about pain, breastfeeding latch, personal care. See flow record.  Response: Positive attachment behaviors observed with infant. Support persons are present.   Plan: Anticipate discharge on Tuesday.

## 2019-02-26 NOTE — PROGRESS NOTES
Post Partum Progress Note  PPD# 2    Subjective:  Did not sleep overnight as baby was awake. Pain well controlled. Eating and drinking w/o difficulty. Voiding spontaneously. Lochia about the same as a period. Working on breastfeeding. Ambulating w/o dizziness. Showered yesterday.     Objective:  Vitals:    19 0600 19 0830 19 1557 19 0015   BP: 102/70 117/68 108/62 112/74   Pulse:   72 75   Resp: 18 16 16 16   Temp: 97.3  F (36.3  C) 98.5  F (36.9  C) 97.8  F (36.6  C) 98  F (36.7  C)   TempSrc: Oral Oral Oral Oral     General: NAD. A&Ox3.  CV: Regular rate, well perfused.   Pulm: Normal respiratory effort.  Abd: Soft, non-tender, non-distended. Fundus is firm and 3 cm below the umbilicus.    Ext: no lower extremity edema bilaterally. No calf tenderness.    Assessment/Plan:  Cici Bennett is a 28  female who is PPD#2 s/p spontaneous vaginal delivery. Pregnancy complications included gestational thrombocytopenia and hx of hep B/A.    - PNC: Rh pos. Rubella immune.   - Pain: controlled on oral medications  - Heme: Hgb 12.4>>11.1.    GTCP vs ITP: Plt 85>91.   - GI: continue stool softeners as needed.  - : Voiding spontaneously.  - ID: isolated fever Tmax 101.1 on , suspect due to miso. Has been afebrile since with no signs of infection.  - Infant: Stable in pt's room  - Feeding: Breast  - BC: Undecided, info sheet given. Was too tired to discuss further this AM  - Hep B core antibody pos, serum antigen neg. Suspect distant infection. VL pending  - Hep A antibody IgG pos, asymptomatic. Hep C cruz neg.     Discharge to home today.     Lauren Carr MD  Ob/Gyn, PGY3  Pager [phone]    Appreciate Dr. Carr's note above, patient also seen and examined by me. I agree with the note above.   Mary Carvajal MD

## 2019-02-26 NOTE — PLAN OF CARE
Patient is comfortable and declined motrin. She is discharge today with baby and discharge education/instructions reviewed with  and verbalized understanding.

## 2019-03-05 ENCOUNTER — TELEPHONE (OUTPATIENT)
Dept: PEDIATRICS | Facility: CLINIC | Age: 29
End: 2019-03-05
Payer: COMMERCIAL

## 2019-03-05 DIAGNOSIS — Z91.89 AT RISK FOR INEFFECTIVE BREASTFEEDING: Primary | ICD-10-CM

## 2019-03-05 RX ORDER — BREAST PUMP
1 EACH MISCELLANEOUS DAILY
Qty: 1 EACH | Refills: 1 | Status: SHIPPED | OUTPATIENT
Start: 2019-03-05

## 2019-03-05 NOTE — TELEPHONE ENCOUNTER
Ordered breast milk pump, given to parents with [facility] home medical location handout.  Yulisa Veras RN

## 2022-11-13 ENCOUNTER — HOSPITAL ENCOUNTER (INPATIENT)
Facility: CLINIC | Age: 32
LOS: 1 days | Discharge: HOME | DRG: 833 | End: 2022-11-14
Attending: OBSTETRICS & GYNECOLOGY | Admitting: OBSTETRICS & GYNECOLOGY
Payer: COMMERCIAL

## 2022-11-13 LAB
ABO/RH(D): NORMAL
ANTIBODY SCREEN: NEGATIVE
ERYTHROCYTE [DISTWIDTH] IN BLOOD BY AUTOMATED COUNT: 12.9 % (ref 10–15)
HCT VFR BLD AUTO: 36.4 % (ref 35–47)
HGB BLD-MCNC: 12.8 G/DL (ref 11.7–15.7)
MCH RBC QN AUTO: 32.5 PG (ref 26.5–33)
MCHC RBC AUTO-ENTMCNC: 35.2 G/DL (ref 31.5–36.5)
MCV RBC AUTO: 92 FL (ref 78–100)
PLATELET # BLD AUTO: 119 10E3/UL (ref 150–450)
RBC # BLD AUTO: 3.94 10E6/UL (ref 3.8–5.2)
SPECIMEN EXPIRATION DATE: NORMAL
WBC # BLD AUTO: 10.6 10E3/UL (ref 4–11)

## 2022-11-13 PROCEDURE — 85027 COMPLETE CBC AUTOMATED: CPT

## 2022-11-13 PROCEDURE — 120N000002 HC R&B MED SURG/OB UMMC

## 2022-11-13 PROCEDURE — 999N000104 HC STATISTIC NO CHARGE

## 2022-11-13 PROCEDURE — 86850 RBC ANTIBODY SCREEN: CPT

## 2022-11-13 PROCEDURE — 250N000011 HC RX IP 250 OP 636

## 2022-11-13 PROCEDURE — 258N000003 HC RX IP 258 OP 636: Performed by: STUDENT IN AN ORGANIZED HEALTH CARE EDUCATION/TRAINING PROGRAM

## 2022-11-13 PROCEDURE — 86780 TREPONEMA PALLIDUM: CPT

## 2022-11-13 RX ORDER — KETOROLAC TROMETHAMINE 30 MG/ML
30 INJECTION, SOLUTION INTRAMUSCULAR; INTRAVENOUS
Status: DISCONTINUED | OUTPATIENT
Start: 2022-11-13 | End: 2022-11-14 | Stop reason: HOSPADM

## 2022-11-13 RX ORDER — NALOXONE HYDROCHLORIDE 0.4 MG/ML
0.2 INJECTION, SOLUTION INTRAMUSCULAR; INTRAVENOUS; SUBCUTANEOUS
Status: DISCONTINUED | OUTPATIENT
Start: 2022-11-13 | End: 2022-11-14 | Stop reason: HOSPADM

## 2022-11-13 RX ORDER — PROCHLORPERAZINE MALEATE 10 MG
10 TABLET ORAL EVERY 6 HOURS PRN
Status: DISCONTINUED | OUTPATIENT
Start: 2022-11-13 | End: 2022-11-14 | Stop reason: HOSPADM

## 2022-11-13 RX ORDER — TRANEXAMIC ACID 10 MG/ML
1 INJECTION, SOLUTION INTRAVENOUS EVERY 30 MIN PRN
Status: DISCONTINUED | OUTPATIENT
Start: 2022-11-13 | End: 2022-11-14 | Stop reason: HOSPADM

## 2022-11-13 RX ORDER — MISOPROSTOL 200 UG/1
TABLET ORAL
Status: DISPENSED
Start: 2022-11-13 | End: 2022-11-14

## 2022-11-13 RX ORDER — OXYTOCIN 10 [USP'U]/ML
INJECTION, SOLUTION INTRAMUSCULAR; INTRAVENOUS
Status: DISPENSED
Start: 2022-11-13 | End: 2022-11-14

## 2022-11-13 RX ORDER — ONDANSETRON 4 MG/1
4 TABLET, ORALLY DISINTEGRATING ORAL EVERY 6 HOURS PRN
Status: DISCONTINUED | OUTPATIENT
Start: 2022-11-13 | End: 2022-11-14 | Stop reason: HOSPADM

## 2022-11-13 RX ORDER — PENICILLIN G POTASSIUM 5000000 [IU]/1
5 INJECTION, POWDER, FOR SOLUTION INTRAMUSCULAR; INTRAVENOUS ONCE
Status: DISCONTINUED | OUTPATIENT
Start: 2022-11-13 | End: 2022-11-13

## 2022-11-13 RX ORDER — NALOXONE HYDROCHLORIDE 0.4 MG/ML
0.4 INJECTION, SOLUTION INTRAMUSCULAR; INTRAVENOUS; SUBCUTANEOUS
Status: DISCONTINUED | OUTPATIENT
Start: 2022-11-13 | End: 2022-11-14 | Stop reason: HOSPADM

## 2022-11-13 RX ORDER — SODIUM CHLORIDE, SODIUM LACTATE, POTASSIUM CHLORIDE, CALCIUM CHLORIDE 600; 310; 30; 20 MG/100ML; MG/100ML; MG/100ML; MG/100ML
INJECTION, SOLUTION INTRAVENOUS CONTINUOUS
Status: DISCONTINUED | OUTPATIENT
Start: 2022-11-13 | End: 2022-11-14 | Stop reason: HOSPADM

## 2022-11-13 RX ORDER — MISOPROSTOL 200 UG/1
400 TABLET ORAL
Status: DISCONTINUED | OUTPATIENT
Start: 2022-11-13 | End: 2022-11-14 | Stop reason: HOSPADM

## 2022-11-13 RX ORDER — CARBOPROST TROMETHAMINE 250 UG/ML
250 INJECTION, SOLUTION INTRAMUSCULAR
Status: DISCONTINUED | OUTPATIENT
Start: 2022-11-13 | End: 2022-11-14 | Stop reason: HOSPADM

## 2022-11-13 RX ORDER — OXYTOCIN/0.9 % SODIUM CHLORIDE 30/500 ML
PLASTIC BAG, INJECTION (ML) INTRAVENOUS
Status: DISPENSED
Start: 2022-11-13 | End: 2022-11-14

## 2022-11-13 RX ORDER — ONDANSETRON 2 MG/ML
4 INJECTION INTRAMUSCULAR; INTRAVENOUS EVERY 6 HOURS PRN
Status: DISCONTINUED | OUTPATIENT
Start: 2022-11-13 | End: 2022-11-14 | Stop reason: HOSPADM

## 2022-11-13 RX ORDER — PENICILLIN G POTASSIUM 5000000 [IU]/1
5 INJECTION, POWDER, FOR SOLUTION INTRAMUSCULAR; INTRAVENOUS ONCE
Status: COMPLETED | OUTPATIENT
Start: 2022-11-13 | End: 2022-11-13

## 2022-11-13 RX ORDER — FENTANYL CITRATE 50 UG/ML
100 INJECTION, SOLUTION INTRAMUSCULAR; INTRAVENOUS
Status: DISCONTINUED | OUTPATIENT
Start: 2022-11-13 | End: 2022-11-14 | Stop reason: HOSPADM

## 2022-11-13 RX ORDER — LIDOCAINE HYDROCHLORIDE 10 MG/ML
INJECTION, SOLUTION EPIDURAL; INFILTRATION; INTRACAUDAL; PERINEURAL
Status: DISPENSED
Start: 2022-11-13 | End: 2022-11-14

## 2022-11-13 RX ORDER — OXYTOCIN/0.9 % SODIUM CHLORIDE 30/500 ML
100-340 PLASTIC BAG, INJECTION (ML) INTRAVENOUS CONTINUOUS PRN
Status: DISCONTINUED | OUTPATIENT
Start: 2022-11-13 | End: 2022-11-14 | Stop reason: HOSPADM

## 2022-11-13 RX ORDER — METHYLERGONOVINE MALEATE 0.2 MG/ML
200 INJECTION INTRAVENOUS
Status: DISCONTINUED | OUTPATIENT
Start: 2022-11-13 | End: 2022-11-14 | Stop reason: HOSPADM

## 2022-11-13 RX ORDER — CITRIC ACID/SODIUM CITRATE 334-500MG
30 SOLUTION, ORAL ORAL
Status: DISCONTINUED | OUTPATIENT
Start: 2022-11-13 | End: 2022-11-14 | Stop reason: HOSPADM

## 2022-11-13 RX ORDER — METOCLOPRAMIDE HYDROCHLORIDE 5 MG/ML
10 INJECTION INTRAMUSCULAR; INTRAVENOUS EVERY 6 HOURS PRN
Status: DISCONTINUED | OUTPATIENT
Start: 2022-11-13 | End: 2022-11-14 | Stop reason: HOSPADM

## 2022-11-13 RX ORDER — OXYTOCIN/0.9 % SODIUM CHLORIDE 30/500 ML
340 PLASTIC BAG, INJECTION (ML) INTRAVENOUS CONTINUOUS PRN
Status: DISCONTINUED | OUTPATIENT
Start: 2022-11-13 | End: 2022-11-14 | Stop reason: HOSPADM

## 2022-11-13 RX ORDER — PROCHLORPERAZINE 25 MG
25 SUPPOSITORY, RECTAL RECTAL EVERY 12 HOURS PRN
Status: DISCONTINUED | OUTPATIENT
Start: 2022-11-13 | End: 2022-11-14 | Stop reason: HOSPADM

## 2022-11-13 RX ORDER — HYDROXYZINE HYDROCHLORIDE 50 MG/1
100 TABLET, FILM COATED ORAL
Status: DISCONTINUED | OUTPATIENT
Start: 2022-11-13 | End: 2022-11-14 | Stop reason: HOSPADM

## 2022-11-13 RX ORDER — IBUPROFEN 800 MG/1
800 TABLET, FILM COATED ORAL
Status: DISCONTINUED | OUTPATIENT
Start: 2022-11-13 | End: 2022-11-14 | Stop reason: HOSPADM

## 2022-11-13 RX ORDER — OXYTOCIN 10 [USP'U]/ML
10 INJECTION, SOLUTION INTRAMUSCULAR; INTRAVENOUS
Status: DISCONTINUED | OUTPATIENT
Start: 2022-11-13 | End: 2022-11-14 | Stop reason: HOSPADM

## 2022-11-13 RX ORDER — MISOPROSTOL 200 UG/1
800 TABLET ORAL
Status: DISCONTINUED | OUTPATIENT
Start: 2022-11-13 | End: 2022-11-14 | Stop reason: HOSPADM

## 2022-11-13 RX ORDER — METOCLOPRAMIDE 10 MG/1
10 TABLET ORAL EVERY 6 HOURS PRN
Status: DISCONTINUED | OUTPATIENT
Start: 2022-11-13 | End: 2022-11-14 | Stop reason: HOSPADM

## 2022-11-13 RX ORDER — PENICILLIN G 3000000 [IU]/50ML
3 INJECTION, SOLUTION INTRAVENOUS EVERY 4 HOURS
Status: DISCONTINUED | OUTPATIENT
Start: 2022-11-13 | End: 2022-11-13

## 2022-11-13 RX ORDER — ACETAMINOPHEN 325 MG/1
650 TABLET ORAL EVERY 4 HOURS PRN
Status: DISCONTINUED | OUTPATIENT
Start: 2022-11-13 | End: 2022-11-14 | Stop reason: HOSPADM

## 2022-11-13 RX ORDER — PENICILLIN G 3000000 [IU]/50ML
3 INJECTION, SOLUTION INTRAVENOUS EVERY 4 HOURS
Status: DISCONTINUED | OUTPATIENT
Start: 2022-11-14 | End: 2022-11-14

## 2022-11-13 RX ADMIN — SODIUM CHLORIDE, POTASSIUM CHLORIDE, SODIUM LACTATE AND CALCIUM CHLORIDE: 600; 310; 30; 20 INJECTION, SOLUTION INTRAVENOUS at 20:52

## 2022-11-13 RX ADMIN — PENICILLIN G POTASSIUM 5 MILLION UNITS: 5000000 POWDER, FOR SOLUTION INTRAMUSCULAR; INTRAPLEURAL; INTRATHECAL; INTRAVENOUS at 20:54

## 2022-11-13 ASSESSMENT — ACTIVITIES OF DAILY LIVING (ADL)
ADLS_ACUITY_SCORE: 18

## 2022-11-14 ENCOUNTER — HOSPITAL ENCOUNTER (INPATIENT)
Facility: CLINIC | Age: 32
End: 2022-11-14
Admitting: OBSTETRICS & GYNECOLOGY
Payer: COMMERCIAL

## 2022-11-14 VITALS
TEMPERATURE: 98.2 F | DIASTOLIC BLOOD PRESSURE: 55 MMHG | HEART RATE: 73 BPM | SYSTOLIC BLOOD PRESSURE: 97 MMHG | RESPIRATION RATE: 16 BRPM

## 2022-11-14 LAB — T PALLIDUM AB SER QL: NONREACTIVE

## 2022-11-14 PROCEDURE — 250N000011 HC RX IP 250 OP 636

## 2022-11-14 PROCEDURE — 250N000011 HC RX IP 250 OP 636: Performed by: STUDENT IN AN ORGANIZED HEALTH CARE EDUCATION/TRAINING PROGRAM

## 2022-11-14 RX ORDER — PENICILLIN G 3000000 [IU]/50ML
3 INJECTION, SOLUTION INTRAVENOUS EVERY 4 HOURS
Status: DISCONTINUED | OUTPATIENT
Start: 2022-11-14 | End: 2022-11-14 | Stop reason: HOSPADM

## 2022-11-14 RX ORDER — PENICILLIN G POTASSIUM 5000000 [IU]/1
5 INJECTION, POWDER, FOR SOLUTION INTRAMUSCULAR; INTRAVENOUS ONCE
Status: DISCONTINUED | OUTPATIENT
Start: 2022-11-14 | End: 2022-11-14 | Stop reason: HOSPADM

## 2022-11-14 RX ADMIN — PENICILLIN G 3 MILLION UNITS: 3000000 INJECTION, SOLUTION INTRAVENOUS at 05:00

## 2022-11-14 RX ADMIN — PENICILLIN G 3 MILLION UNITS: 3000000 INJECTION, SOLUTION INTRAVENOUS at 01:00

## 2022-11-14 ASSESSMENT — ACTIVITIES OF DAILY LIVING (ADL)
ADLS_ACUITY_SCORE: 18

## 2022-11-14 NOTE — PLAN OF CARE
Goal Outcome Evaluation:      Plan of Care Reviewed With: patient    Overall Patient Progress: improvingOverall Patient Progress: improving       Pt admitted for frequent contractions. RN assumed care at 1900. VSS, EFM charted (see flowsheet), pt afebrile. Pt denies headache, vision changes, RUQ pain, bleeding. Pt comfortable in bed with support person, Nicolas, at bedside. Plan per provider is to recheck cervix this morning and adjust plan of care as needed. Pt stated she has no questions or concerns with plan of care at this time. Call light within reach. Report given to Rhianna Moreau RN.

## 2022-11-14 NOTE — PROGRESS NOTES
[facility]  Fetal Heart Tracing Review      FHT: Baseline 125, mod variability, accelerations present, no decelerations  [place]: 2 contractions in 10 minutes    Assessment/Plan:  Cici Bennett is a 32 year old  at 38w5d by 22w5d US, here for rule out labor.    Labor:  - Expectant management. Vistaril for sleep. Cervix slightly changed from prior. Will recheck in morning.  - Pain management: Desires unmedicated delivery.  - FWB: Cat 1, reactive and reassuring. Continue EFM and toco.  - GBS pos - on penicillin    Eugenie Barrera MD  Obstetrics & Gynecology, PGY-3  2022 4:04 AM

## 2022-11-14 NOTE — PROGRESS NOTES
*Documentation delayed due to patient care*  [facility]  Labor Progress Note    Subjective:  Patient is still uncomfortable with contractions.     Objective:   Patient Vitals for the past 4 hrs:   BP Temp Temp src Resp   22 0102 90/55 98.4  F (36.9  C) Oral 16     SVE: 2/80/-2  Membranes: intact    FHT: Baseline 125, mod variability, accelerations present, no decelerations  [place]: 2 contractions in 10 minutes    Assessment/Plan:  Cici Bennett is a 32 year old  at 38w5d by 22w5d US, here for rule out labor.    Labor:  - Expectant management. Vistaril for sleep. Cervix slightly changed from prior. Will recheck in morning.  - Pain management: Desires unmedicated delivery.  - FWB: Cat 1, reactive and reassuring. Continue EFM and toco.  - GBS pos - on penicillin    Eugenie Barrera MD  Obstetrics & Gynecology, PGY-3  2022 2:54 AM

## 2022-11-14 NOTE — PLAN OF CARE
SVE with no change. 2/80/-2. Plan to discharge to home. [facility]  used over the phone for all cares. Discharge instructions given. Discharged to home ambulatory at 0900.

## 2022-11-14 NOTE — ED NOTES
Pt arrived to ED with complaint of pelvic and back pain .  Pt reports 40+ weeks pregnant.   Pt is having contractions Yes.   Pt feels urge to push Yes.   Pt reports water broke No.   Report was called and pt was transferred to L&D Yes.

## 2022-11-14 NOTE — PROGRESS NOTES
Brief OB Progress Note    In to see patient to repeat exam. Observed overnight for labor, but slept comfortably. This AM feeling mild cramping. SVE 2/80/-2    FHT: category 1, reactive and reassuring  [place]: 0-1 in 10    Plan: Discharge home, not in labor    Ashley Bauer MD  ObGyn Resident, PGY-3  11/14/2022

## 2022-11-14 NOTE — PLAN OF CARE
Data: Patient admitted to room 442 at 1811. Patient is a . Prenatal record reviewed.   OB History    Para Term  AB Living   4 3 3 0 0 3   SAB IAB Ectopic Multiple Live Births   0 0 0 0 3      # Outcome Date GA Lbr [place]/2nd Weight Sex Delivery Anes PTL Lv   4 Current            3 Term 19 38w6d 01:30 / 00:24 3.742 kg (8 lb 4 oz) M Vag-Spont IV N JACIEL      Complications: Meconium staining      Name: ALEX CANALES      Apgar1: 5  Apgar5: 7   2 Term         JACIEL   1 Term         JACIEL   .  Medical History: History reviewed. No pertinent past medical history..  Gestational age Unknown. Vital signs per doc flowsheet. Fetal movement present. Patient reports Laboring   as reason for admission. Support persons Zaid and Nicolas present.  Action: Report from NICOLE Gallo obtained at 191. Care of patient assumed at 1910. Verbal consent for EFM, external fetal monitors applied. Admission assessment completed. Patient and support persons educated on labor process. Patient instructed to report change in fetal movement, contractions, vaginal leaking of fluid or bleeding, abdominal pain, or any concerns related to the pregnancy to her nurse/physician. Patient oriented to room, call light in reach.   Response: Dr. Cortez and  informed of pt arrival. Plan per provider is expectant management. Provider states she wants to start penicillin as soon as possible in case labor progresses quickly. Patient verbalized understanding of education and verbalized agreement with plan. Patient coping with labor via breathing and hot packs.

## 2022-11-14 NOTE — H&P
[facility]  OB History and Physical      Cici Canales MRN# [id number]   Age: 32 year old YOB: 1990     CC:      HPI:  Cici Canales is a 32 year old  at 38w4d by 22w5d US, who presents with painful contractions since last night. She denies vaginal bleeding, LOF. Normal fetal movement.    She denies history of postpartum hemorrhage, shoulder dystocia, high blood pressures, asthma. She denies prior abdominal surgeries.    ROS:   Complete 10-point ROS negative except as noted in HPI. She denies headache, blurry vision, chest pain, shortness of breath, RUQ pain.    Pregnancy Complications:  - Suspected fetal macrosomia    Prenatal Labs:   - Rh positive, rubella immune, HIV neg, RPR neg, Hep B neg/immune  - GCT/GTT passed    GBS Status: positive    OB History  OB History    Para Term  AB Living   4 3 3 0 0 3   SAB IAB Ectopic Multiple Live Births   0 0 0 0 3      # Outcome Date GA Lbr [place]/2nd Weight Sex Delivery Anes PTL Lv   4 Current            3 Term 19 38w6d 01:30 / 00:24 3.742 kg (8 lb 4 oz) M Vag-Spont IV N JACIEL      Complications: Meconium staining      Name: ALEX CANALES      Apgar1: 5  Apgar5: 7   2 Term         JACIEL   1 Term         JACIEL       PMHx: None  PSHx: None  Meds:   Medications Prior to Admission   Medication Sig Dispense Refill Last Dose    Misc. Devices (BREAST PUMP) MISC 1 each daily 1 each 1 Unknown     Allergies:  No Known Allergies   FmHx: History reviewed. No pertinent family history.  SocHx:  She denies any tobacco, alcohol, or other drug use during this pregnancy.    PE:  Vit:   Patient Vitals for the past 4 hrs:   BP Temp Temp src Resp   22 2220 108/55 98.2  F (36.8  C) Oral 18      Gen: Well-appearing, NAD, comfortable  CV: Well perfused, regular rate   Pulm: Breathing comfortably  Abd: Soft, gravid, non-tender  Ext: trace LE edema b/l  Cx: /-1 per RN    Pres:  cephalic by BSUS  EFW:  8lbs by Leopold's  Memb: intact               FHT: Baseline 130, mod variability, accelerations present, no decelerations   [place]: 2-3 contractions in 10 minutes      Assessment/Plan:  Cici Bennett is a 32 year old , at 38w4d by 22w5d US, who presents with painful contractions.    Rule out Labor  - Admit to L&D  - Labor: Will reassess cervix in 4h for cervical change. Will not augment labor as patient is 38w4d. Can give Vistaril for sleep if contractions continue overnight  - FWB: Category 1 FHT.  Continue EFM and toco  - Pain: Desires unmedicated delivery  - PNC: Rh pos, Rubella imm, GBS POS - will start penicillin now as patient is multip, GCT nl  - Fen/GI: Reg diet, IVF      The patient was discussed with Dr. Cortez who is in agreement with the treatment plan.    Eugenie Barrera MD  Obstetrics & Gynecology, PGY-3  2022 11:37 PM    Physician Attestation   I personally examined and evaluated this patient.  I discussed the patient with the resident/fellow and care team, and agree with the assessment and plan of care as documented in the note of 22.      I personally reviewed vital signs, medications, labs, and fetal heart rate tracing .    Key findings: Category 1 tracing. Early labor.  Zeenat Cortez MD  Date of Service (when I saw the patient): 22

## 2022-11-14 NOTE — PROVIDER NOTIFICATION
11/13/22 2051   Provider Notification   Provider Name/Title Dr. Barrera   Method of Notification Electronic Page   Request Evaluate - Remote   Notification Reason Other (Comment)   Can you put in an order for continuous fluids?

## 2022-11-14 NOTE — DISCHARGE INSTRUCTIONS
Discharge Instruction for Undelivered Patients      You were seen for: Labor Assessment  We Consulted: MD Team (Dr Cortez and Dr Us)  You had (Test or Medicine):Fetal monitoring and cervical exams     Diet:   Drink 8 to 12 glasses of liquids (milk, juice, water) every day.  You may eat meals and snacks.     Activity:  Call your doctor or nurse midwife if your baby is moving less than usual.     Call your provider if you notice:  Swelling in your face or increased swelling in your hands or legs.  Headaches that are not relieved by Tylenol (acetaminophen).  Changes in your vision (blurring: seeing spots or stars.)  Nausea (sick to your stomach) and vomiting (throwing up).   Weight gain of 5 pounds or more per week.  Heartburn that doesn't go away.  Signs of bladder infection: pain when you urinate (use the toilet), need to go more often and more urgently.  The bag of perez (rupture of membranes) breaks, or you notice leaking in your underwear.  Bright red blood in your underwear.  Abdominal (lower belly) or stomach pain.  Second (plus) baby: Contractions (tightening) less than 10 minutes apart and getting stronger.  Increase or change in vaginal discharge (note the color and amount)    Follow-up:  The clinic will call about rescheduling your prenatal visit to later this week.

## 2022-11-14 NOTE — DISCHARGE SUMMARY
[facility] Discharge Summary    Cici Bennett MRN# [id number]   Age: 32 year old YOB: 1990     Date of Admission:  2022  Date of Discharge:  2022  Admitting Physician:  Zeenat Cortez MD  Discharge Physician:  JANUARY MULLIGAN MD     Admission Diagnosis:  - Contractions    Discharge Diagnosis:  Same, not in labor    Procedures:  none    Consultations:  none    Medications prior to admission:  Medications Prior to Admission   Medication Sig Dispense Refill Last Dose     Misc. Devices (BREAST PUMP) MISC 1 each daily 1 each 1 Unknown         Brief History of Presentation:  Cici Bennett is a 32 year old  at 38w4d by 22w5d US, who presents with painful contractions since last night. She denies vaginal bleeding, LOF. Normal fetal movement.    Hospital Course:  She was observed overnight for spontaneous labor. Her cervix changed initially from 1 to 2 cm with 2 separate examiners, then had no further dilation. SVE prior to discharge was unchanged.    Discharge Instructions:  Call or present to labor and delivery if you experience:   -Regular painful contractions concerning for labor   -Leakage of fluid concerning for ruptured membranes   -Decreased fetal movement   -Bright red vaginal bleeding    -Headache, vision changes, upper abdominal pain, significant increase in swelling,   generalized unwell feeling    Follow up:  Follow up in OB clinic later this week.    Discharge Medications:     Review of your medicines      CONTINUE these medicines which have NOT CHANGED      Dose / Directions   breast pump Misc  Used for: At risk for ineffective breastfeeding      Dose: 1 each  1 each daily  Quantity: 1 each  Refills: 1              Ashley Bauer MD  ObGyn Resident, PGY-3  2022

## 2022-11-14 NOTE — PROVIDER NOTIFICATION
11/14/22 0124   Provider Notification   Provider Name/Title Dr. Barrera   Method of Notification In Department   Request Evaluate - Remote   Notification Reason Status Update   RN review plan of care with provider.

## 2022-11-18 ENCOUNTER — HOSPITAL ENCOUNTER (INPATIENT)
Facility: CLINIC | Age: 32
LOS: 2 days | Discharge: HOME | End: 2022-11-20
Attending: OBSTETRICS & GYNECOLOGY | Admitting: OBSTETRICS & GYNECOLOGY
Payer: COMMERCIAL

## 2022-11-18 PROBLEM — Z34.90 TERM PREGNANCY: Status: ACTIVE | Noted: 2022-11-18

## 2022-11-18 LAB
ABO/RH(D): NORMAL
ANTIBODY SCREEN: NEGATIVE
ERYTHROCYTE [DISTWIDTH] IN BLOOD BY AUTOMATED COUNT: 12.9 % (ref 10–15)
HCT VFR BLD AUTO: 37.5 % (ref 35–47)
HGB BLD-MCNC: 13.3 G/DL (ref 11.7–15.7)
MCH RBC QN AUTO: 32.4 PG (ref 26.5–33)
MCHC RBC AUTO-ENTMCNC: 35.5 G/DL (ref 31.5–36.5)
MCV RBC AUTO: 92 FL (ref 78–100)
PLATELET # BLD AUTO: 120 10E3/UL (ref 150–450)
RBC # BLD AUTO: 4.1 10E6/UL (ref 3.8–5.2)
SPECIMEN EXPIRATION DATE: NORMAL
WBC # BLD AUTO: 12.4 10E3/UL (ref 4–11)

## 2022-11-18 PROCEDURE — 250N000011 HC RX IP 250 OP 636: Performed by: OBSTETRICS & GYNECOLOGY

## 2022-11-18 PROCEDURE — 86850 RBC ANTIBODY SCREEN: CPT | Performed by: OBSTETRICS & GYNECOLOGY

## 2022-11-18 PROCEDURE — 85027 COMPLETE CBC AUTOMATED: CPT | Performed by: OBSTETRICS & GYNECOLOGY

## 2022-11-18 PROCEDURE — 86901 BLOOD TYPING SEROLOGIC RH(D): CPT | Performed by: OBSTETRICS & GYNECOLOGY

## 2022-11-18 PROCEDURE — 120N000002 HC R&B MED SURG/OB UMMC

## 2022-11-18 PROCEDURE — 86780 TREPONEMA PALLIDUM: CPT | Performed by: OBSTETRICS & GYNECOLOGY

## 2022-11-18 PROCEDURE — 258N000003 HC RX IP 258 OP 636

## 2022-11-18 RX ORDER — PENICILLIN G 3000000 [IU]/50ML
3 INJECTION, SOLUTION INTRAVENOUS EVERY 4 HOURS
Status: DISCONTINUED | OUTPATIENT
Start: 2022-11-19 | End: 2022-11-19 | Stop reason: HOSPADM

## 2022-11-18 RX ORDER — METOCLOPRAMIDE HYDROCHLORIDE 5 MG/ML
10 INJECTION INTRAMUSCULAR; INTRAVENOUS EVERY 6 HOURS PRN
Status: DISCONTINUED | OUTPATIENT
Start: 2022-11-18 | End: 2022-11-19 | Stop reason: HOSPADM

## 2022-11-18 RX ORDER — OXYTOCIN/0.9 % SODIUM CHLORIDE 30/500 ML
100-340 PLASTIC BAG, INJECTION (ML) INTRAVENOUS CONTINUOUS PRN
Status: DISCONTINUED | OUTPATIENT
Start: 2022-11-18 | End: 2022-11-19

## 2022-11-18 RX ORDER — OXYTOCIN/0.9 % SODIUM CHLORIDE 30/500 ML
PLASTIC BAG, INJECTION (ML) INTRAVENOUS
Status: DISCONTINUED
Start: 2022-11-18 | End: 2022-11-19 | Stop reason: HOSPADM

## 2022-11-18 RX ORDER — OXYTOCIN/0.9 % SODIUM CHLORIDE 30/500 ML
340 PLASTIC BAG, INJECTION (ML) INTRAVENOUS CONTINUOUS PRN
Status: DISCONTINUED | OUTPATIENT
Start: 2022-11-18 | End: 2022-11-19 | Stop reason: HOSPADM

## 2022-11-18 RX ORDER — MISOPROSTOL 200 UG/1
400 TABLET ORAL
Status: DISCONTINUED | OUTPATIENT
Start: 2022-11-18 | End: 2022-11-19 | Stop reason: HOSPADM

## 2022-11-18 RX ORDER — OXYTOCIN 10 [USP'U]/ML
10 INJECTION, SOLUTION INTRAMUSCULAR; INTRAVENOUS
Status: DISCONTINUED | OUTPATIENT
Start: 2022-11-18 | End: 2022-11-19

## 2022-11-18 RX ORDER — MISOPROSTOL 200 UG/1
800 TABLET ORAL
Status: DISCONTINUED | OUTPATIENT
Start: 2022-11-18 | End: 2022-11-19 | Stop reason: HOSPADM

## 2022-11-18 RX ORDER — METOCLOPRAMIDE 10 MG/1
10 TABLET ORAL EVERY 6 HOURS PRN
Status: DISCONTINUED | OUTPATIENT
Start: 2022-11-18 | End: 2022-11-19 | Stop reason: HOSPADM

## 2022-11-18 RX ORDER — CITRIC ACID/SODIUM CITRATE 334-500MG
30 SOLUTION, ORAL ORAL
Status: DISCONTINUED | OUTPATIENT
Start: 2022-11-18 | End: 2022-11-19 | Stop reason: HOSPADM

## 2022-11-18 RX ORDER — CARBOPROST TROMETHAMINE 250 UG/ML
250 INJECTION, SOLUTION INTRAMUSCULAR
Status: DISCONTINUED | OUTPATIENT
Start: 2022-11-18 | End: 2022-11-19 | Stop reason: HOSPADM

## 2022-11-18 RX ORDER — PROCHLORPERAZINE 25 MG
25 SUPPOSITORY, RECTAL RECTAL EVERY 12 HOURS PRN
Status: DISCONTINUED | OUTPATIENT
Start: 2022-11-18 | End: 2022-11-19 | Stop reason: HOSPADM

## 2022-11-18 RX ORDER — NALOXONE HYDROCHLORIDE 0.4 MG/ML
0.4 INJECTION, SOLUTION INTRAMUSCULAR; INTRAVENOUS; SUBCUTANEOUS
Status: DISCONTINUED | OUTPATIENT
Start: 2022-11-18 | End: 2022-11-19 | Stop reason: HOSPADM

## 2022-11-18 RX ORDER — NALOXONE HYDROCHLORIDE 0.4 MG/ML
0.2 INJECTION, SOLUTION INTRAMUSCULAR; INTRAVENOUS; SUBCUTANEOUS
Status: DISCONTINUED | OUTPATIENT
Start: 2022-11-18 | End: 2022-11-19 | Stop reason: HOSPADM

## 2022-11-18 RX ORDER — ONDANSETRON 2 MG/ML
4 INJECTION INTRAMUSCULAR; INTRAVENOUS EVERY 6 HOURS PRN
Status: DISCONTINUED | OUTPATIENT
Start: 2022-11-18 | End: 2022-11-19 | Stop reason: HOSPADM

## 2022-11-18 RX ORDER — PENICILLIN G POTASSIUM 5000000 [IU]/1
5 INJECTION, POWDER, FOR SOLUTION INTRAMUSCULAR; INTRAVENOUS ONCE
Status: COMPLETED | OUTPATIENT
Start: 2022-11-18 | End: 2022-11-19

## 2022-11-18 RX ORDER — METHYLERGONOVINE MALEATE 0.2 MG/ML
200 INJECTION INTRAVENOUS
Status: DISCONTINUED | OUTPATIENT
Start: 2022-11-18 | End: 2022-11-19 | Stop reason: HOSPADM

## 2022-11-18 RX ORDER — ONDANSETRON 4 MG/1
4 TABLET, ORALLY DISINTEGRATING ORAL EVERY 6 HOURS PRN
Status: DISCONTINUED | OUTPATIENT
Start: 2022-11-18 | End: 2022-11-19 | Stop reason: HOSPADM

## 2022-11-18 RX ORDER — KETOROLAC TROMETHAMINE 30 MG/ML
30 INJECTION, SOLUTION INTRAMUSCULAR; INTRAVENOUS
Status: DISCONTINUED | OUTPATIENT
Start: 2022-11-18 | End: 2022-11-19

## 2022-11-18 RX ORDER — TRANEXAMIC ACID 10 MG/ML
1 INJECTION, SOLUTION INTRAVENOUS EVERY 30 MIN PRN
Status: DISCONTINUED | OUTPATIENT
Start: 2022-11-18 | End: 2022-11-19 | Stop reason: HOSPADM

## 2022-11-18 RX ORDER — LIDOCAINE 40 MG/G
CREAM TOPICAL
Status: DISCONTINUED | OUTPATIENT
Start: 2022-11-18 | End: 2022-11-19 | Stop reason: HOSPADM

## 2022-11-18 RX ORDER — PROCHLORPERAZINE MALEATE 10 MG
10 TABLET ORAL EVERY 6 HOURS PRN
Status: DISCONTINUED | OUTPATIENT
Start: 2022-11-18 | End: 2022-11-19 | Stop reason: HOSPADM

## 2022-11-18 RX ORDER — OXYTOCIN 10 [USP'U]/ML
INJECTION, SOLUTION INTRAMUSCULAR; INTRAVENOUS
Status: DISCONTINUED
Start: 2022-11-18 | End: 2022-11-19 | Stop reason: HOSPADM

## 2022-11-18 RX ORDER — LIDOCAINE HYDROCHLORIDE 10 MG/ML
INJECTION, SOLUTION EPIDURAL; INFILTRATION; INTRACAUDAL; PERINEURAL
Status: DISCONTINUED
Start: 2022-11-18 | End: 2022-11-19 | Stop reason: HOSPADM

## 2022-11-18 RX ORDER — MISOPROSTOL 200 UG/1
TABLET ORAL
Status: DISCONTINUED
Start: 2022-11-18 | End: 2022-11-19 | Stop reason: HOSPADM

## 2022-11-18 RX ORDER — OXYTOCIN 10 [USP'U]/ML
10 INJECTION, SOLUTION INTRAMUSCULAR; INTRAVENOUS
Status: DISCONTINUED | OUTPATIENT
Start: 2022-11-18 | End: 2022-11-19 | Stop reason: HOSPADM

## 2022-11-18 RX ORDER — IBUPROFEN 800 MG/1
800 TABLET, FILM COATED ORAL
Status: DISCONTINUED | OUTPATIENT
Start: 2022-11-18 | End: 2022-11-19

## 2022-11-18 RX ORDER — SODIUM CHLORIDE, SODIUM LACTATE, POTASSIUM CHLORIDE, CALCIUM CHLORIDE 600; 310; 30; 20 MG/100ML; MG/100ML; MG/100ML; MG/100ML
INJECTION, SOLUTION INTRAVENOUS
Status: COMPLETED
Start: 2022-11-18 | End: 2022-11-18

## 2022-11-18 RX ADMIN — SODIUM CHLORIDE, POTASSIUM CHLORIDE, SODIUM LACTATE AND CALCIUM CHLORIDE 1000 ML: 600; 310; 30; 20 INJECTION, SOLUTION INTRAVENOUS at 23:17

## 2022-11-18 RX ADMIN — PENICILLIN G POTASSIUM 5 MILLION UNITS: 5000000 POWDER, FOR SOLUTION INTRAMUSCULAR; INTRAPLEURAL; INTRATHECAL; INTRAVENOUS at 23:16

## 2022-11-18 ASSESSMENT — ACTIVITIES OF DAILY LIVING (ADL): ADLS_ACUITY_SCORE: 35

## 2022-11-19 LAB
SARS-COV-2 RNA RESP QL NAA+PROBE: NEGATIVE
T PALLIDUM AB SER QL: NONREACTIVE

## 2022-11-19 PROCEDURE — 59409 OBSTETRICAL CARE: CPT | Mod: GC | Performed by: OBSTETRICS & GYNECOLOGY

## 2022-11-19 PROCEDURE — 99232 SBSQ HOSP IP/OBS MODERATE 35: CPT | Mod: GC | Performed by: OBSTETRICS & GYNECOLOGY

## 2022-11-19 PROCEDURE — 250N000013 HC RX MED GY IP 250 OP 250 PS 637: Performed by: STUDENT IN AN ORGANIZED HEALTH CARE EDUCATION/TRAINING PROGRAM

## 2022-11-19 PROCEDURE — 120N000002 HC R&B MED SURG/OB UMMC

## 2022-11-19 PROCEDURE — G0463 HOSPITAL OUTPT CLINIC VISIT: HCPCS

## 2022-11-19 PROCEDURE — 722N000001 HC LABOR CARE VAGINAL DELIVERY SINGLE

## 2022-11-19 PROCEDURE — 250N000009 HC RX 250: Performed by: OBSTETRICS & GYNECOLOGY

## 2022-11-19 PROCEDURE — 250N000011 HC RX IP 250 OP 636: Performed by: OBSTETRICS & GYNECOLOGY

## 2022-11-19 PROCEDURE — 250N000011 HC RX IP 250 OP 636: Performed by: STUDENT IN AN ORGANIZED HEALTH CARE EDUCATION/TRAINING PROGRAM

## 2022-11-19 PROCEDURE — U0003 INFECTIOUS AGENT DETECTION BY NUCLEIC ACID (DNA OR RNA); SEVERE ACUTE RESPIRATORY SYNDROME CORONAVIRUS 2 (SARS-COV-2) (CORONAVIRUS DISEASE [COVID-19]), AMPLIFIED PROBE TECHNIQUE, MAKING USE OF HIGH THROUGHPUT TECHNOLOGIES AS DESCRIBED BY CMS-2020-01-R: HCPCS | Performed by: OBSTETRICS & GYNECOLOGY

## 2022-11-19 PROCEDURE — 0HQ9XZZ REPAIR PERINEUM SKIN, EXTERNAL APPROACH: ICD-10-PCS | Performed by: OBSTETRICS & GYNECOLOGY

## 2022-11-19 RX ORDER — ACETAMINOPHEN 325 MG/1
650 TABLET ORAL EVERY 4 HOURS PRN
Status: DISCONTINUED | OUTPATIENT
Start: 2022-11-19 | End: 2022-11-20 | Stop reason: HOSPADM

## 2022-11-19 RX ORDER — IBUPROFEN 800 MG/1
800 TABLET, FILM COATED ORAL EVERY 6 HOURS PRN
Status: DISCONTINUED | OUTPATIENT
Start: 2022-11-19 | End: 2022-11-20 | Stop reason: HOSPADM

## 2022-11-19 RX ORDER — FENTANYL CITRATE 50 UG/ML
50 INJECTION, SOLUTION INTRAMUSCULAR; INTRAVENOUS ONCE
Status: COMPLETED | OUTPATIENT
Start: 2022-11-19 | End: 2022-11-19

## 2022-11-19 RX ORDER — OXYTOCIN/0.9 % SODIUM CHLORIDE 30/500 ML
340 PLASTIC BAG, INJECTION (ML) INTRAVENOUS CONTINUOUS PRN
Status: DISCONTINUED | OUTPATIENT
Start: 2022-11-19 | End: 2022-11-20 | Stop reason: HOSPADM

## 2022-11-19 RX ORDER — METHYLERGONOVINE MALEATE 0.2 MG/ML
200 INJECTION INTRAVENOUS
Status: DISCONTINUED | OUTPATIENT
Start: 2022-11-19 | End: 2022-11-20 | Stop reason: HOSPADM

## 2022-11-19 RX ORDER — IBUPROFEN 600 MG/1
600 TABLET, FILM COATED ORAL EVERY 6 HOURS PRN
Qty: 60 TABLET | Refills: 0 | Status: SHIPPED | OUTPATIENT
Start: 2022-11-19

## 2022-11-19 RX ORDER — OXYTOCIN 10 [USP'U]/ML
10 INJECTION, SOLUTION INTRAMUSCULAR; INTRAVENOUS
Status: DISCONTINUED | OUTPATIENT
Start: 2022-11-19 | End: 2022-11-20 | Stop reason: HOSPADM

## 2022-11-19 RX ORDER — MISOPROSTOL 200 UG/1
400 TABLET ORAL
Status: DISCONTINUED | OUTPATIENT
Start: 2022-11-19 | End: 2022-11-20 | Stop reason: HOSPADM

## 2022-11-19 RX ORDER — HYDROCORTISONE 25 MG/G
CREAM TOPICAL 3 TIMES DAILY PRN
Status: DISCONTINUED | OUTPATIENT
Start: 2022-11-19 | End: 2022-11-20 | Stop reason: HOSPADM

## 2022-11-19 RX ORDER — ACETAMINOPHEN 325 MG/1
650 TABLET ORAL EVERY 6 HOURS PRN
Qty: 100 TABLET | Refills: 0 | Status: SHIPPED | OUTPATIENT
Start: 2022-11-19

## 2022-11-19 RX ORDER — AMOXICILLIN 250 MG
1 CAPSULE ORAL DAILY
Qty: 100 TABLET | Refills: 0 | Status: SHIPPED | OUTPATIENT
Start: 2022-11-19

## 2022-11-19 RX ORDER — SODIUM CHLORIDE, SODIUM LACTATE, POTASSIUM CHLORIDE, CALCIUM CHLORIDE 600; 310; 30; 20 MG/100ML; MG/100ML; MG/100ML; MG/100ML
INJECTION, SOLUTION INTRAVENOUS
Status: DISCONTINUED
Start: 2022-11-19 | End: 2022-11-19 | Stop reason: HOSPADM

## 2022-11-19 RX ORDER — BISACODYL 10 MG
10 SUPPOSITORY, RECTAL RECTAL DAILY PRN
Status: DISCONTINUED | OUTPATIENT
Start: 2022-11-19 | End: 2022-11-20 | Stop reason: HOSPADM

## 2022-11-19 RX ORDER — TRANEXAMIC ACID 10 MG/ML
1 INJECTION, SOLUTION INTRAVENOUS EVERY 30 MIN PRN
Status: DISCONTINUED | OUTPATIENT
Start: 2022-11-19 | End: 2022-11-20 | Stop reason: HOSPADM

## 2022-11-19 RX ORDER — CARBOPROST TROMETHAMINE 250 UG/ML
250 INJECTION, SOLUTION INTRAMUSCULAR
Status: DISCONTINUED | OUTPATIENT
Start: 2022-11-19 | End: 2022-11-20 | Stop reason: HOSPADM

## 2022-11-19 RX ORDER — MISOPROSTOL 200 UG/1
800 TABLET ORAL
Status: DISCONTINUED | OUTPATIENT
Start: 2022-11-19 | End: 2022-11-20 | Stop reason: HOSPADM

## 2022-11-19 RX ORDER — DOCUSATE SODIUM 100 MG/1
100 CAPSULE, LIQUID FILLED ORAL DAILY
Status: DISCONTINUED | OUTPATIENT
Start: 2022-11-19 | End: 2022-11-20 | Stop reason: HOSPADM

## 2022-11-19 RX ORDER — MODIFIED LANOLIN
OINTMENT (GRAM) TOPICAL
Status: DISCONTINUED | OUTPATIENT
Start: 2022-11-19 | End: 2022-11-20 | Stop reason: HOSPADM

## 2022-11-19 RX ADMIN — PENICILLIN G 3 MILLION UNITS: 3000000 INJECTION, SOLUTION INTRAVENOUS at 03:03

## 2022-11-19 RX ADMIN — MISOPROSTOL 800 MCG: 200 TABLET ORAL at 06:25

## 2022-11-19 RX ADMIN — Medication 340 ML/HR: at 03:24

## 2022-11-19 RX ADMIN — DOCUSATE SODIUM 100 MG: 100 CAPSULE, LIQUID FILLED ORAL at 13:07

## 2022-11-19 RX ADMIN — KETOROLAC TROMETHAMINE 30 MG: 30 INJECTION, SOLUTION INTRAMUSCULAR at 04:35

## 2022-11-19 RX ADMIN — ACETAMINOPHEN 650 MG: 325 TABLET, FILM COATED ORAL at 13:08

## 2022-11-19 RX ADMIN — LIDOCAINE HYDROCHLORIDE 10 ML: 10 INJECTION, SOLUTION EPIDURAL; INFILTRATION; INTRACAUDAL; PERINEURAL at 03:29

## 2022-11-19 RX ADMIN — FENTANYL CITRATE 50 MCG: 50 INJECTION, SOLUTION INTRAMUSCULAR; INTRAVENOUS at 00:38

## 2022-11-19 RX ADMIN — IBUPROFEN 800 MG: 800 TABLET, FILM COATED ORAL at 13:07

## 2022-11-19 ASSESSMENT — ACTIVITIES OF DAILY LIVING (ADL)
ADLS_ACUITY_SCORE: 18

## 2022-11-19 NOTE — PROGRESS NOTES
[facility]  Labor Progress Note    S:  To patient's room for prolonged deceleration. FHR at baseline once entering room. Having significant discomfort, Fentanyl helped somewhat. Comfortable with SVE>     O:   Patient Vitals for the past 4 hrs:   Temp Temp src   22 2300 99.4  F (37.4  C) Oral     SVE: 6/50/-2, intact     FHT: Baseline 140, mod variability, pos accelerations, no decelerations  [place]: 2-3 contractions in 10 minutes    A/P:  Ms. Cici Bennett is a 32 year old  at 39w3d here in spontaneous labor. Pregnancy complicated by GBS positive status.     Labor: - Continue without augmentation. Consider IV pitocin prn, AROM s/p adequate PCN   FWB: - Category II FHT but progressing spontaneously   PNC: - Rh positive, Rubella immune, GBS positive, GCT passed    Vangie Huntley MD  OB/GYN PGY-3  22 1:15 AM

## 2022-11-19 NOTE — L&D DELIVERY NOTE
Delivery Summary    Cici Bennett MRN# [id number]   Age: 32 year old YOB: 1990     ASSESSMENT & PLAN:   Delivery Summary    Cici Bennett is a 32 year old now  at 39w3d, admitted in spontaneous labor in pregnancy complicated by scant prenatal care, GBS positive status, fetal macrosomia, failed GCT - passed GTT. She was counseled on the risk of shoulder dystocia with the assistance of an Oromo , and was agreeable in proceeding with a vaginal delivery. She received IV Fentanyl for pain control. For GBS positive status, she received adequate penicillin treatment before SROM. She presented with a favorable cervical exam and progressed spontaneously to complete cervical dilation, at which time she had SROM of thick meconium fluid. With delivery of the fetal head, a shoulder dystocia was identified. NICU was called to the room, and staff attending physician called to the room. The patient's legs were placed in McRobert's positioning by two bedside nurses, and suprapubic pressure applied without relief of the dystocia. Anterior and posterior rotational maneuvers, followed by attempt at delivery of the posterior arm or anterior arm with no success. Shoulder shrug of the posterior shoulder (right) then performed with release of the shoulder dystocia. The patient then pushed and with good maternal effort delivered a liveborn female infant on 22 at 0322 from the right occiput anterior position. Cord was clamped and cut immediately and the infant passed to the awaiting NICU staff. Apgars 4/6/9, weight 4060g. Umbilical arterial blood gas: pH 7.20, base deficit 4.7. Umbilical venous blood gas: pH 7.35, base deficit 2.3.  IV Pitocin started. Placenta delivered with fundal massage, gentle cord traction and suprapubic countertraction; noted to be intact with 3 vessel cord. A first degree perineal laceration was noted and repaired with running locked and subcuticular 3-0 Vicryl. Fundus firm  and lochia minimal at the end of the case. QBL 100cc.     Dr. Patel was present for the latter part of the delivery and the repair.     Vangie Huntley MD  Ob/Gyn Resident, PGY-3  22 4:16 AM      Martine Bennett [[id number]]    Labor Event Times    Labor onset date: 22 Onset time:  2:30 AM   Dilation complete date: 22 Complete time:  3:19 AM   Start pushing date/time: 2022 031      Labor Length    1st Stage (hrs): 0 (min): 49   2nd Stage (hrs): 0 (min): 3   3rd Stage (hrs): 0 (min): 4      Labor Events     labor?: No   steroids: None  Labor Type: Spontaneous  Predominate monitoring during 1st stage: continuous electronic fetal monitoring     Antibiotics received during labor?: Yes  Reason for Antibiotics: GBS  Antibiotics received for GBS: Penicillin  Antibiotics Given (GBS): Less than or equal to 4 hours prior to delivery     Rupture identifier: Sac 1  Rupture date/time: 22   Rupture type: Spontaneous rupture of membranes occuring during spontaneous labor or augmentation  Fluid color: Meconium     1:1 continuous labor support provided by?: RN Labor partogram used?: no      Delivery/Placenta Date and Time    Delivery Date: 22 Delivery Time:  3:22 AM   Placenta Date/Time: 2022  3:26 AM  Oxytocin given at the time of delivery: after delivery of baby  Delivering clinician: Emmy Patel MD   Other personnel present at delivery:  Provider Role   Vianey Acosta, RN Delivery Nurse   Aye Silva RN Delivery Nurse   Allyson Herring RN Delivery Assist   Lea Sahni RN Delivery Assist         Vaginal Counts     Initial count performed by 2 team members:  Two Team Members   JOSE Acosta RN, MD       Tyrone Suture Needles Sponges (RETIRED) Instruments   Initial counts 2 0 5    Added to count  1     Relief counts       Final counts             Placed during labor Accounted for at the end of labor   FSE No NA    IUPC No NA   Cervidil No NA                     Apgars    Living status: Living   1 Minute 5 Minute 10 Minute 15 Minute 20 Minute   Skin color:        Heart rate:        Reflex irritability:        Muscle tone:        Respiratory effort:        Total:           Cord    Vessels: 3 Vessels    Cord Complications: None               Cord Blood Disposition: Lab    Gases Sent?: Yes    Delayed cord clamping?: No        Resuscitation    Output in Delivery Room: Stool      Measurements    Weight: 8 lb 15.2 oz    Output in delivery room: Stool     Labor Events and Shoulder Dystocia    Fetal Tracing Prior to Delivery: Category 2  Shoulder dystocia present?: Pos  Anterior shoulder: left       First Maneuver: Trevor maneuver       Second Maneuver:   Second maneuver: Suprapubic pressure     Third Maneuver  Third maneuver: Woods screw maneuver      Fourth Maneuver  Fourth maneuver: Delivery of the posterior arm      Delivery (Maternal) (Provider to Complete) ([id number])    Perineal lacerations: 1st Repaired?: Yes   Repair suture: 3-0 Vicryl  Genital tract inspection done: Pos     Blood Loss  Mother: Cici Bennett #[id number]   Start of Mother's Information    Delivery Blood Loss  22 0230 - 22 0416    None           End of Mother's Information  Mother: Cici Bennett #[id number]          Delivery - Provider to Complete ([id number])    Delivering clinician: Emmy Patel MD                   Other personnel:  Provider Role   Vianey Acosta, RN Delivery Nurse   Aye Silva, RN Delivery Nurse   Allyson Herring, NICOLE Delivery Assist   Lea Sahni RN Delivery Assist                Placenta    Date/Time: 2022  3:26 AM  Removal: Spontaneous  Disposition: Pathology           Anesthesia    Method: None                Presentation and Position    Presentation: Vertex    Position: Right Occiput Anterior

## 2022-11-19 NOTE — PROVIDER NOTIFICATION
11/19/22 0206   Provider Notification   Provider Name/Title Yuko MORENO   Method of Notification In Department   Request Evaluate - Remote   Notification Reason Variability Change;Status Update   Requested MD review FHR tracing.

## 2022-11-19 NOTE — PLAN OF CARE
**Delay in publishing note d/t direct patient care**    Vaginal Delivery Note   of viable Female with MD, resident MD, RNx4 in attendance. NICU and resource RN present.  Infant with to mother's abdomen, dried and stimulated, then immediately brought to warmer by NICU team.  See delivery summary for APGARs. Placenta delivered with out complication, pitocin infusing per orders, 1st degree laceration with repair, renita cares provided. Mother in stable condition. Baby taken to NICU for further assessment.    Shoulder Dystocia Delivery Note  Date of Delivery: 2022  Time Head Delivered: 321  Time Body Delivered: 032  Total Time: 1 minute    Initial Traction: Gentle attempt at traction, assisted by maternal expulsive forces  Maneuvers utilized to facilitate delivery: Trevor maneuver and Suprapubic pressure. As RNs positioned patient with her legs back and HOB flat, patient kicked legs straight attempting to cope with pain. She was unmedicated. RNs who were supporting legs positioned them as patient allowed.  The arm under the symphysis at the point the head was delivered was: Right    Vianey Acosta RN  2022

## 2022-11-19 NOTE — DISCHARGE SUMMARY
VAGINAL DELIVERY DISCHARGE SUMMARY    Cici Bennett  : 1990  MRN: [id number]    Admit date: 2022  Discharge date: 2022    Admit Dx:   - 32 year old  at 39w2d  - Scant prenatal care  - GBS positive status  - Fetal macrosomia    Discharge Dx:  - Same as above, s/p     Procedures:  - Spontaneous vaginal delivery    Admit HPI:  Ms. Cici Bennett is a  at 39w2d who was admitted for spontaneous labor on 2022. Please see her admit H&P for full details of her PMH, PSH, Meds, Allergies and exam on admit.    Hospital course:  Cici Bennett was admitted to the hospital on 2022 for the above listed indications. She was counseled on the risk of shoulder dystocia with the assistance of an Oromo , and was agreeable in proceeding with a vaginal delivery. She received IV fentanyl for pain control. For GBS positive status, she received adequate penicillin treatment before SROM. She presented with a favorable cervical exam and progressed spontaneously to complete cervical dilation, at which time she had SROM of thick meconium fluid. With delivery of the fetal head, a shoulder dystocia was identified. NICU was called to the room, and staff attending physician called to the room. The patient's legs were placed in McRobert's positioning by two bedside nurses, and suprapubic pressure applied without relief of the dystocia. Anterior and posterior rotational maneuvers, followed by attempt at delivery of the posterior arm or anterior arm with no success. Shoulder shrug of the posterior shoulder (right) then performed with release of the shoulder dystocia. The patient then pushed and with good maternal effort delivered a liveborn female infant on 22 at 0322 from the right occiput anterior position. Cord was clamped and cut immediately and the infant passed to the awaiting NICU staff. Apgars 4/6/9, weight 4060g. Umbilical arterial blood gas: pH 7.20, base deficit 4.7.  Umbilical venous blood gas: pH 7.35, base deficit 2.3. IV Pitocin started. Placenta delivered with fundal massage, gentle cord traction and suprapubic countertraction; noted to be intact with 3 vessel cord. A first degree perineal laceration was noted and repaired with running locked and subcuticular 3-0 Vicryl. Fundus firm and lochia minimal at the end of the case. QBL 100cc    Her postpartum course was uncomplicated. On PPD#1, she was meeting all of her postpartum goals and deemed stable for discharge. She was voiding without difficulty, tolerating a regular diet without nausea and vomiting, her pain was well controlled on oral pain medicines and her lochia was appropriate. Her hemoglobin prior to delivery was 13.3 and after delivery was 12.3. Her Rh status was positive, and Rhogam was not indicated.     Discharge Medications:  Current Discharge Medication List      START taking these medications    Details   acetaminophen (TYLENOL) 325 MG tablet Take 2 tablets (650 mg) by mouth every 6 hours as needed for mild pain Start after Delivery.  Qty: 100 tablet, Refills: 0    Associated Diagnoses:  (normal spontaneous vaginal delivery)      ibuprofen (ADVIL/MOTRIN) 600 MG tablet Take 1 tablet (600 mg) by mouth every 6 hours as needed for moderate pain (4-6) Start after delivery  Qty: 60 tablet, Refills: 0    Associated Diagnoses:  (normal spontaneous vaginal delivery)      senna-docusate (SENOKOT-S/PERICOLACE) 8.6-50 MG tablet Take 1 tablet by mouth daily Start after delivery.  Qty: 100 tablet, Refills: 0    Associated Diagnoses:  (normal spontaneous vaginal delivery)         CONTINUE these medications which have NOT CHANGED    Details   Misc. Devices (BREAST PUMP) MISC 1 each daily  Qty: 1 each, Refills: 1    Associated Diagnoses: At risk for ineffective breastfeeding           Discharge/Disposition:  Cici Bennett was discharged to home in stable condition with the following instructions/medications:  1)  Call for temperature > 100.4, bright red vaginal bleeding >1 pad an hour x 2 hours, foul smelling vaginal discharge, pain not controlled by usual oral pain meds, persistent nausea and vomiting not controlled on medications  2) She declined contraception.  3) For feeding she decided to breastfeed.  4) She was instructed to follow-up with her primary OB in 6 weeks for a routine postpartum visit.    Tatiana Donahue MD  OB/GYN PGY-1

## 2022-11-19 NOTE — PROGRESS NOTES
Patient arrived to [facility] unit via wheelchair at 0845,with belongings, accompanied by spouse/ significant other, with infant in arms. Received report from NICOLE Miller and checked bands. Unit and room orientation completd. Call light within arms reach; no concerns present at this time. Continue with plan of care.

## 2022-11-19 NOTE — H&P
[facility] Labor and Delivery History and Physical    Cici Canales MRN# [id number]   Age: 32 year old YOB: 1990     Date of Admission:  2022    Primary care provider: Mike Lassiter           Chief Complaint:   Cici Canales is a 32 year old female who is 39w3d pregnant who presents with contractions        Pregnancy history:   She reports contractions starting around 6 pm in the evening and getting stronger and closer together. No vaginal bleeding or LOF and feeling fetal movement. Patient denies pregnancy complications and received her care through [facility].     OBSTETRIC HISTORY:    OB History    Para Term  AB Living   4 3 3 0 0 3   SAB IAB Ectopic Multiple Live Births   0 0 0 0 3      # Outcome Date GA Lbr Napoleon/2nd Weight Sex Delivery Anes PTL Lv   4 Current            3 Term 19 38w6d 01:30 / 00:24 3.742 kg (8 lb 4 oz) M Vag-Spont IV N JACIEL      Complications: Meconium staining      Name: ALEX CANALES      Apgar1: 5  Apgar5: 7   2 Term         JACIEL   1 Term         JACIEL       EDC: Estimated Date of Delivery: 22    Prenatal Labs:   Lab Results   Component Value Date    ABO O 2019    RH Pos 2019    AS Negative 2022    HEPBANG Negative 2018    HGB 13.3 2022       GBS Status: positive at OSH  Active Problem List  Patient Active Problem List   Diagnosis     Encounter for triage in pregnant patient     Labor and delivery, indication for care      (normal spontaneous vaginal delivery)     Labor and delivery indication for care or intervention     Term pregnancy       Medication Prior to Admission  Medications Prior to Admission   Medication Sig Dispense Refill Last Dose     Misc. Devices (BREAST PUMP) MISC 1 each daily 1 each 1    .        Maternal Past Medical History:   No past medical history on file.   Patient denies medical problems                     Family History:   No  contributory             Social History:   This patient has no significant social history         Review of Systems:   See HPI           Physical Exam:   Vital signs:  Temp: 99.4  F (37.4  C) Temp src: Oral                    There is no height or weight on file to calculate BMI.    General: Patient alert and oriented, no acute distress  CV: no peripheral edema or cyanosis  Resp: normal respiratory effort and equal lung expansion  Abdomen: gravid, NT   Ext: non-tender, no edema    FHTs: 140/mod/+accels, no decels, upon presentation which progressed to a cat II tracing with intermittent variable decelerations   [place]: q 2-4   SVE: 3/60/-3, cephalic by sutures     EFW 4000g by leopold's           Assessment:   Cici Bennett is a 39w3d pregnant female admitted in labor, pregnancy complicated by fetal macrosomia         Plan:   1. FWB   -Cat I FHTs initially but now Cat II with intermittent variable decels but overall reassuring, will continue to closely monitor.     2. Fetal macrosomia  -EFW by ultrasound on 11/1 3565 g, projected to be 4354 g at 39 weeks.   -Patient has a proven pelvis to 02276 g and denies hx of shoulder dystocia or other labor complications. She failed the GCT but passed the GTT so no GDM.   -We reviewed the risks of fetal macrosomia including risk of shoulder dystocia and fetal injury and that these risks increase as baby's size increases. We discussed the margin of error with the ultrasound and that an attempt at vaginal delivery is considered reasonable for EFW <5000g as long as the patient is not diabetic and that the only way to avoid this risk would be to proceed with a CS. The patient declines and is comfortable proceeding with attempt at vaginal delivery.     3. Labor  -Expectant management for now but will augment with DIVP and AROM PRN     4. Routine PNC   -Rh+/RI  -HIV, RPR, Hep B negative   -Tdap and flu given 9/12/2022   -COVID vaccine x2 in 2021, will offer booster prior to hospital  discharge     5. Pain   -Patient requesting IV pain medicine     Dispo: admit to L&D     Emmy Patel MD

## 2022-11-19 NOTE — PROVIDER NOTIFICATION
"   11/19/22 0314   Provider Notification   Provider Name/Title Yuko MORENO   Method of Notification Electronic Page   Request Evaluate in Person   Notification Reason Membrane Status   Paged MD, \"2nd dose of PCN infusing. Pt okay with AROM. \"  "

## 2022-11-19 NOTE — PLAN OF CARE
Goal Outcome Evaluation:       Data: Vss and PP check WNL. Spouse at bedside. Pt. Breastfeeding on demand. Pt. Able to void spontaneously without difficulty. Positive attachment behaviors observed with infant.   Intervention: Tylenol and ibuprofen given for intermittent low back pain. Encouraged to breastfeed every 2-3 hours. Provided positioning techniques for breastfeeding. Nipple shield provided, education provided see flowsheet. Used  service to explain cares to Pt.    Plan: Interpretor needed to fill out birth certificate and EDS. Continue with plan of care

## 2022-11-19 NOTE — PROGRESS NOTES
[facility]  Labor Progress Note    S:  Patient feeling more pressure, requesting SVE.    O:   Patient Vitals for the past 4 hrs:   Temp Temp src   22 2300 99.4  F (37.4  C) Oral     SVE: 50/-2    FHT: Baseline 140, mod variability, pos accelerations, no decelerations  [place]: 2-3 contractions in 10 minutes    A/P:  Ms. Cici Bennett is a 32 year old  at 39w3d here in spontaneous labor. Pregnancy complicated by GBS positive status.     Labor: - Continue without augmentation. Consider IV pitocin prn, AROM s/p adequate PCN   FWB: - Category I FHT  PNC: - Rh positive, Rubella immune, GBS positive, GCT passed    Vangie Huntley MD  OB/GYN PGY-3  22 12:16 AM

## 2022-11-19 NOTE — PROVIDER NOTIFICATION
11/19/22 0318   Provider Notification   Provider Name/Title Yuko MORENO, Jorge MORENO   Method of Notification Phone;Electronic Page   Request Attend Delivery   Notification Reason Membrane Status;Labor Status   Charge RN, Lea, samid MDs to come to bedside stat for pt bearing down involuntarily, SVE per RN, and SROM mec.

## 2022-11-19 NOTE — PROVIDER NOTIFICATION
11/19/22 0615   Provider Notification   Provider Name/Title Yuko Hendricks   Method of Notification In Department   Request Evaluate in Person   Notification Reason Status Update     Notified Dr. Huntley of trickle of bleeding for patient MD Cici went bedside and assessed the patient, Cytotec placed by provider.

## 2022-11-20 VITALS
SYSTOLIC BLOOD PRESSURE: 114 MMHG | OXYGEN SATURATION: 97 % | HEART RATE: 72 BPM | TEMPERATURE: 98.4 F | DIASTOLIC BLOOD PRESSURE: 89 MMHG | RESPIRATION RATE: 16 BRPM

## 2022-11-20 LAB — HGB BLD-MCNC: 12.3 G/DL (ref 11.7–15.7)

## 2022-11-20 PROCEDURE — 99238 HOSP IP/OBS DSCHRG MGMT 30/<: CPT | Mod: GC | Performed by: OBSTETRICS & GYNECOLOGY

## 2022-11-20 PROCEDURE — 36415 COLL VENOUS BLD VENIPUNCTURE: CPT | Performed by: STUDENT IN AN ORGANIZED HEALTH CARE EDUCATION/TRAINING PROGRAM

## 2022-11-20 PROCEDURE — 250N000013 HC RX MED GY IP 250 OP 250 PS 637: Performed by: STUDENT IN AN ORGANIZED HEALTH CARE EDUCATION/TRAINING PROGRAM

## 2022-11-20 PROCEDURE — 85018 HEMOGLOBIN: CPT | Performed by: STUDENT IN AN ORGANIZED HEALTH CARE EDUCATION/TRAINING PROGRAM

## 2022-11-20 RX ADMIN — DOCUSATE SODIUM 100 MG: 100 CAPSULE, LIQUID FILLED ORAL at 08:28

## 2022-11-20 RX ADMIN — ACETAMINOPHEN 650 MG: 325 TABLET, FILM COATED ORAL at 00:54

## 2022-11-20 RX ADMIN — ACETAMINOPHEN 650 MG: 325 TABLET, FILM COATED ORAL at 06:53

## 2022-11-20 RX ADMIN — ACETAMINOPHEN 650 MG: 325 TABLET, FILM COATED ORAL at 13:30

## 2022-11-20 RX ADMIN — IBUPROFEN 800 MG: 800 TABLET, FILM COATED ORAL at 13:30

## 2022-11-20 RX ADMIN — IBUPROFEN 800 MG: 800 TABLET, FILM COATED ORAL at 00:55

## 2022-11-20 RX ADMIN — IBUPROFEN 800 MG: 800 TABLET, FILM COATED ORAL at 06:53

## 2022-11-20 ASSESSMENT — ACTIVITIES OF DAILY LIVING (ADL)
ADLS_ACUITY_SCORE: 18

## 2022-11-20 NOTE — PROGRESS NOTES
Post-partum progress note     Cici Bennett is a(n) 32 year old  on post partum day 1 s/p  complicated by 60 second shoulder dystocia.  She reports lochia is normal, tolerating a regular diet, ambulating well, adequate pain control with oral pain medication, and voiding without difficulty.  She has no complaints.  She is breastfeeding.        Physical Exam:    Looks well.    Patient Vitals for the past 24 hrs:   BP Temp Temp src Pulse Resp   22 0817 114/89 98.4  F (36.9  C) Oral 72 16   22 0030 114/81 97.6  F (36.4  C) Oral 69 16   22 1719 115/75 97.9  F (36.6  C) Oral 67 16   22 1330 117/78 99  F (37.2  C) Oral 63 16       Temp (48hrs), Av.6  F (37  C), Min:97.6  F (36.4  C), Max:99.5  F (37.5  C)  Uterine fundus: firm and below U  Extremities: non-tender trace edema       Labs:   Lab Results   Component Value Date    ABORH O POS 2022          Assessment/Plan:    Cici Bennett is a 32 year old  who is PPD#1 s/p     1. AFVSS  2. Rh+/RI/Tdap UTD  3. Pain: well controlled with po medicaiton  4. PPBC:declines today   5. Routine post-partum cares  -Encourage ambulation  -Lactation support PRN  6. Shoulder dystocia   -Reviewed events of delivery with Oromo  and discussed risk of recurrence with future pregnancy and that a primary CS may be recommended in the future, especially if patient has GDM or fetal macrosomia with future pregnancy. All questions answered.     Dispo: anticipate discharge to home today, patient plans to follow with her primary OB through [facility] for PP care         2022 10:34 AM Emmy Patel MD

## 2022-11-20 NOTE — PLAN OF CARE
Goal Outcome Evaluation:  VSS and assessment are WDL. Fundus midline, firm, and U/U. Lochia is scant. Pain adequately managed with tylenol and ibuprofen. Able to void . First degree of laceration. Breast and formula feeding, encouraged waking baby and feeding every 3 hour. Bonding well with . Continue with plan of care.

## 2025-01-27 ENCOUNTER — TRANSFERRED RECORDS (OUTPATIENT)
Dept: HEALTH INFORMATION MANAGEMENT | Facility: CLINIC | Age: 35
End: 2025-01-27

## 2025-01-27 ENCOUNTER — TRANSCRIBE ORDERS (OUTPATIENT)
Dept: MATERNAL FETAL MEDICINE | Facility: CLINIC | Age: 35
End: 2025-01-27
Payer: COMMERCIAL

## 2025-01-27 DIAGNOSIS — O26.90 PREGNANCY RELATED CONDITION: Primary | ICD-10-CM

## 2025-01-28 ENCOUNTER — TELEPHONE (OUTPATIENT)
Dept: MATERNAL FETAL MEDICINE | Facility: CLINIC | Age: 35
End: 2025-01-28
Payer: COMMERCIAL

## 2025-01-28 NOTE — TELEPHONE ENCOUNTER
Left message with assistance of Oromo  for patient to call [phone] to scheduled transfer of care ob visit with [facility].     Valerie Barone RN

## 2025-01-30 ENCOUNTER — TELEPHONE (OUTPATIENT)
Dept: MATERNAL FETAL MEDICINE | Facility: CLINIC | Age: 35
End: 2025-01-30
Payer: COMMERCIAL

## 2025-01-30 NOTE — TELEPHONE ENCOUNTER
Left message with assistance of [facility]  to call [phone] to schedule [facility] ob visit with [facility].    Valerie Barone RN